# Patient Record
Sex: FEMALE | Race: WHITE | Employment: FULL TIME | URBAN - METROPOLITAN AREA
[De-identification: names, ages, dates, MRNs, and addresses within clinical notes are randomized per-mention and may not be internally consistent; named-entity substitution may affect disease eponyms.]

---

## 2017-06-08 ENCOUNTER — OFFICE VISIT (OUTPATIENT)
Dept: OBGYN CLINIC | Facility: CLINIC | Age: 27
End: 2017-06-08

## 2017-06-08 VITALS
SYSTOLIC BLOOD PRESSURE: 138 MMHG | BODY MASS INDEX: 26 KG/M2 | HEART RATE: 118 BPM | DIASTOLIC BLOOD PRESSURE: 87 MMHG | WEIGHT: 163 LBS

## 2017-06-08 DIAGNOSIS — Z11.3 SCREEN FOR STD (SEXUALLY TRANSMITTED DISEASE): Primary | ICD-10-CM

## 2017-06-08 DIAGNOSIS — N89.8 VAGINAL LESION: ICD-10-CM

## 2017-06-08 DIAGNOSIS — Z30.09 ENCOUNTER FOR COUNSELING REGARDING CONTRACEPTION: ICD-10-CM

## 2017-06-08 PROCEDURE — 99212 OFFICE O/P EST SF 10 MIN: CPT | Performed by: CLINICAL NURSE SPECIALIST

## 2017-06-12 ENCOUNTER — TELEPHONE (OUTPATIENT)
Dept: OBGYN CLINIC | Facility: CLINIC | Age: 27
End: 2017-06-12

## 2017-06-12 NOTE — PROGRESS NOTES
Hector Resendiz is a 24year old female [facility] Patient's last menstrual period was 05/25/2017. Patient presents with:  Gyn Problem: Vaginal irritation pelvic pain  Here with mom.  Last seen in 2015 by DEVON for vaginal irritation and was unhappy with ParaGard Disease Paternal Grandfather      Coronary artery disease    • Thyroid Disorder Mother    • Hypertension Mother    • Thyroid Disorder Maternal Grandmother    • Thyroid Disorder Maternal Aunt    • Heart Disorder Father        Smoking Status: Never Smoker IUD strings visualized  Uterus: normal in size, contour, position, mobility, without tenderness  Adnexa: normal without masses or tenderness  Perineum: normal  Anus: no hemorroids   Lymph node: no inguinal lymph nodes    Assessment & Plan:  Toni Moreira was seen

## 2017-06-12 NOTE — TELEPHONE ENCOUNTER
----- Message from INGRID Diamond sent at 6/12/2017  1:34 PM CDT -----  Please let pt know STD testing and vaginal culture is negative.      MAF

## 2021-04-12 ENCOUNTER — OFFICE VISIT (OUTPATIENT)
Dept: INTERNAL MEDICINE CLINIC | Facility: CLINIC | Age: 31
End: 2021-04-12
Payer: COMMERCIAL

## 2021-04-12 VITALS
HEIGHT: 65 IN | HEART RATE: 71 BPM | DIASTOLIC BLOOD PRESSURE: 75 MMHG | TEMPERATURE: 99 F | WEIGHT: 191 LBS | BODY MASS INDEX: 31.82 KG/M2 | SYSTOLIC BLOOD PRESSURE: 113 MMHG

## 2021-04-12 DIAGNOSIS — Z00.00 ROUTINE GENERAL MEDICAL EXAMINATION AT A HEALTH CARE FACILITY: Primary | ICD-10-CM

## 2021-04-12 DIAGNOSIS — Z91.09 ENVIRONMENTAL ALLERGIES: ICD-10-CM

## 2021-04-12 PROCEDURE — 3074F SYST BP LT 130 MM HG: CPT | Performed by: INTERNAL MEDICINE

## 2021-04-12 PROCEDURE — 99385 PREV VISIT NEW AGE 18-39: CPT | Performed by: INTERNAL MEDICINE

## 2021-04-12 PROCEDURE — 3008F BODY MASS INDEX DOCD: CPT | Performed by: INTERNAL MEDICINE

## 2021-04-12 PROCEDURE — 3078F DIAST BP <80 MM HG: CPT | Performed by: INTERNAL MEDICINE

## 2021-04-12 RX ORDER — BACILLUS COAGULANS/INULIN 1B-250 MG
CAPSULE ORAL
COMMUNITY

## 2021-04-12 RX ORDER — TRETINOIN 0.025 %
GEL (GRAM) TOPICAL
COMMUNITY
Start: 2021-04-07

## 2021-04-12 RX ORDER — KETOCONAZOLE 20 MG/ML
SHAMPOO TOPICAL
COMMUNITY
Start: 2020-11-20

## 2021-04-12 RX ORDER — ALPRAZOLAM 0.25 MG/1
0.25 TABLET ORAL 2 TIMES DAILY PRN
Qty: 40 TABLET | Refills: 0 | Status: SHIPPED | OUTPATIENT
Start: 2021-04-12 | End: 2021-04-13

## 2021-04-12 RX ORDER — DOXYCYCLINE HYCLATE 100 MG/1
CAPSULE ORAL
COMMUNITY
Start: 2021-04-07 | End: 2021-07-21

## 2021-04-13 ENCOUNTER — TELEPHONE (OUTPATIENT)
Dept: INTERNAL MEDICINE CLINIC | Facility: CLINIC | Age: 31
End: 2021-04-13

## 2021-04-13 RX ORDER — ALPRAZOLAM 0.25 MG/1
0.25 TABLET ORAL 2 TIMES DAILY PRN
Qty: 40 TABLET | Refills: 0 | Status: CANCELLED | OUTPATIENT
Start: 2021-04-13

## 2021-04-13 NOTE — TELEPHONE ENCOUNTER
Spoke to patient, informed of orders generated are with a routine general medical examination. Advised patient to call her insurance to confirm ALL tests will be covered with diagnosis.

## 2021-04-13 NOTE — TELEPHONE ENCOUNTER
Dr Vega Freeman: can you please re-send rx to [facility].      rx dated 4/12/21 was sent to incorrect pharm ([facility])

## 2021-04-13 NOTE — TELEPHONE ENCOUNTER
Per patient her rx med  Alprazolam was sent to a wrong pharmacy it needs to re-send to pharmacy on file.

## 2021-04-13 NOTE — PROGRESS NOTES
HPI:    Patient ID: Francesco James is a 36year old female.     HPI    Physical exam    Generally healthy  Exercises regularly  Yoga cardio  Anxious at time  Just bough a house  Stress at work denies depression  Would like to take xanax occasionally    Sees EX Apply to AA (dry areas well, then apply     • Ketoconazole 2 % External Shampoo Apply to scalp 3-5x/wk     • Tretinoin 0.025 % External Gel 1 scarlett     • Bacillus Coagulans-Inulin (PROBIOTIC) 1-250 BILLION-MG Oral Cap Take by mouth.      • ALPRAZolam Salvatore Dong normal.      Pupils: Pupils are equal, round, and reactive to light. Neck:      Thyroid: No thyromegaly. Cardiovascular:      Rate and Rhythm: Normal rate and regular rhythm.       Heart sounds: Normal heart sounds, S1 normal and S2 normal. No murmur he B12 [E]      Meds This Visit:  Requested Prescriptions     Signed Prescriptions Disp Refills   • ALPRAZolam (XANAX) 0.25 MG Oral Tab 40 tablet 0     Sig: Take 1 tablet (0.25 mg total) by mouth 2 (two) times daily as needed for Anxiety.        Imaging & Refe

## 2021-04-13 NOTE — TELEPHONE ENCOUNTER
Patient asking if the labs that she has are routine, because her insurance only covers routine labs. Also patient may call back with fax number for [facility].  She would like labs to be sent to them if she confirms with insurance that they are in her n

## 2021-04-19 ENCOUNTER — APPOINTMENT (OUTPATIENT)
Dept: MRI IMAGING | Facility: HOSPITAL | Age: 31
End: 2021-04-19
Attending: Other
Payer: COMMERCIAL

## 2021-04-19 ENCOUNTER — HOSPITAL ENCOUNTER (OUTPATIENT)
Facility: HOSPITAL | Age: 31
Setting detail: OBSERVATION
Discharge: HOME | End: 2021-04-20
Attending: EMERGENCY MEDICINE | Admitting: HOSPITALIST
Payer: COMMERCIAL

## 2021-04-19 ENCOUNTER — APPOINTMENT (OUTPATIENT)
Dept: CT IMAGING | Facility: HOSPITAL | Age: 31
End: 2021-04-19
Attending: EMERGENCY MEDICINE
Payer: COMMERCIAL

## 2021-04-19 ENCOUNTER — PATIENT MESSAGE (OUTPATIENT)
Dept: INTERNAL MEDICINE CLINIC | Facility: CLINIC | Age: 31
End: 2021-04-19

## 2021-04-19 ENCOUNTER — NURSE TRIAGE (OUTPATIENT)
Dept: INTERNAL MEDICINE CLINIC | Facility: CLINIC | Age: 31
End: 2021-04-19

## 2021-04-19 DIAGNOSIS — H21.562 AFFERENT PUPILLARY DEFECT OF LEFT EYE: Primary | ICD-10-CM

## 2021-04-19 PROCEDURE — 99244 OFF/OP CNSLTJ NEW/EST MOD 40: CPT | Performed by: OTHER

## 2021-04-19 PROCEDURE — 70553 MRI BRAIN STEM W/O & W/DYE: CPT | Performed by: OTHER

## 2021-04-19 PROCEDURE — 99219 INITIAL OBSERVATION CARE,LEVL II: CPT | Performed by: HOSPITALIST

## 2021-04-19 PROCEDURE — 70498 CT ANGIOGRAPHY NECK: CPT | Performed by: EMERGENCY MEDICINE

## 2021-04-19 PROCEDURE — 70543 MRI ORBT/FAC/NCK W/O &W/DYE: CPT | Performed by: OTHER

## 2021-04-19 PROCEDURE — 70496 CT ANGIOGRAPHY HEAD: CPT | Performed by: EMERGENCY MEDICINE

## 2021-04-19 RX ORDER — ALPRAZOLAM 0.25 MG/1
0.25 TABLET ORAL 2 TIMES DAILY PRN
Status: DISCONTINUED | OUTPATIENT
Start: 2021-04-19 | End: 2021-04-20

## 2021-04-19 RX ORDER — ONDANSETRON 2 MG/ML
4 INJECTION INTRAMUSCULAR; INTRAVENOUS EVERY 6 HOURS PRN
Status: DISCONTINUED | OUTPATIENT
Start: 2021-04-19 | End: 2021-04-20

## 2021-04-19 RX ORDER — TEMAZEPAM 15 MG/1
15 CAPSULE ORAL NIGHTLY PRN
Status: DISCONTINUED | OUTPATIENT
Start: 2021-04-19 | End: 2021-04-20

## 2021-04-19 RX ORDER — ACETAMINOPHEN 325 MG/1
650 TABLET ORAL EVERY 6 HOURS PRN
Status: DISCONTINUED | OUTPATIENT
Start: 2021-04-19 | End: 2021-04-20

## 2021-04-19 RX ORDER — CETIRIZINE HYDROCHLORIDE 10 MG/1
10 TABLET ORAL DAILY
Status: DISCONTINUED | OUTPATIENT
Start: 2021-04-20 | End: 2021-04-20

## 2021-04-19 NOTE — TELEPHONE ENCOUNTER
From: Saran Villalba  To: Nidhi Rizo MD  Sent: 4/19/2021 9:57 AM CDT  Subject: Other    Acqupolaa Anabella

## 2021-04-19 NOTE — H&P
[facility]    PATIENT'S NAME: Umberto Pallavi   ATTENDING PHYSICIAN: Sarah Burch MD   PATIENT ACCOUNT#:   [id number]    LOCATION:  [place] [address]  MEDICAL RECORD #:   [id number]       YOB: 1990  ADMISSION DATE:       04/19 is clear. Dry mucous membranes. Normal hard and soft palate. Eyes:  Anicteric sclerae. Left pupil is dilated, nonresponsive to light or accommodation. Afferent and efferent nerve roots tested by testing the right eye which pupil is reactive.   NECK:  S

## 2021-04-19 NOTE — TELEPHONE ENCOUNTER
Advised patient of Dr. Marylu Alegria note. Patient verbalized understanding and already on her way with her mom to the [address] [place] [address] now.

## 2021-04-19 NOTE — ED INITIAL ASSESSMENT (HPI)
Patient presents with left eye dilated since waking up this morning. Notes 4/10 headache behind eyes. Notes some blurry vision.

## 2021-04-19 NOTE — ED PROVIDER NOTES
Patient Seen in: [facility]      History   Patient presents with: Eye Visual Problem  Headache    Stated Complaint: dilated eyes    HPI/Subjective:   HPI  Patient is a 73-year-old female here for asymmetric pupils.   Patient r She is not toxic-appearing. HENT:      Head: Normocephalic. Mouth/Throat:      Mouth: Mucous membranes are moist.   Eyes:      Extraocular Movements: Extraocular movements intact. Conjunctiva/sclera: Conjunctivae normal.      Comments:  Both eye 4/19/2021  CONCLUSION:  1. No acute intracranial process by noncontrast/contrast-enhanced CT technique. 2. No hemodynamically significant stenosis or occlusion of the anterior or posterior intracranial circulation.   3. 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

## 2021-04-19 NOTE — CONSULTS
[facility]  [address] [facility]  [phone]          [address] [facility] [place]    Report of Consultation    Henri Villalba Patient Status:  Emergency diphenhydrAMINE HCl (BENADRYL ALLERGY OR)      • ALUMINUM CHLORIDE HEXAHYDRATE EX Apply to AA (dry areas well, then apply     • Ketoconazole 2 % External Shampoo Apply to scalp 3-5x/wk     • Tretinoin 0.025 % External Gel 1 scarlett     • Bacillus Coagulans-Inu Sexual Activity      Alcohol use: Not Currently        Alcohol/week: 0.0 standard drinks        Comment: Social.       Drug use: Never      Sexual activity: Yes        Partners: Male        Birth control/protection: Paragard        Comment: 10/2013      FA Alert, oriented to person, place and time, Follows commands, and Speech fluent and appropriate. Cranial Nerves:  An alert room she has bilateral 4 mm dilated pupils, in a dark room her left pupil is 4 mm and nonreactive, in a dark room her right pupil is 3 mg/dL 85   Sodium      136 - 145 mmol/L 141   Potassium      3.5 - 5.1 mmol/L 3.8   Chloride      98 - 112 mmol/L 110   Carbon Dioxide, Total      21.0 - 32.0 mmol/L 25.0   ANION GAP      0 - 18 mmol/L 6   BUN      7 - 18 mg/dL 9   CREATININE      0.55 - 1 –If enhancement of optic nerves is seen, will start high-dose steroids (IVMP 1000mg x 5 days)   –Check B12 and MMA      Adalberto Brasher DO   Staff Neurologist   4/19/2021  2:29 PM

## 2021-04-19 NOTE — ED QUICK NOTES
Orders for admission, patient is aware of plan and ready to go upstairs. Any questions, please call ED ABEL Marks  at extension [phone].    Type of COVID test sent:Rapid  COVID Suspicion level: Low    Titratable drug(s) infusing:n/a  Rate:n/a    LOC at time

## 2021-04-19 NOTE — TELEPHONE ENCOUNTER
Action Requested: Summary for Provider     []  Critical Lab, Recommendations Needed  [x] Need Additional Advice  []   FYI    []   Need Orders  [] Need Medications Sent to Pharmacy  []  Other     SUMMARY: Patient stated that she took xanax for the first devin

## 2021-04-19 NOTE — ED QUICK NOTES
Patient presents to ER with concerns of L pupil dilation. Patients Left pupil is dilated. Patient states she woke up like that this AM.   Left pupil is Non reactive to light. Patient denies any vision changes.    Patient is AOx4, equal strength bilate

## 2021-04-20 VITALS
HEART RATE: 84 BPM | RESPIRATION RATE: 18 BRPM | OXYGEN SATURATION: 97 % | SYSTOLIC BLOOD PRESSURE: 128 MMHG | WEIGHT: 190 LBS | DIASTOLIC BLOOD PRESSURE: 83 MMHG | BODY MASS INDEX: 32 KG/M2 | TEMPERATURE: 99 F

## 2021-04-20 PROCEDURE — 99217 OBSERVATION CARE DISCHARGE: CPT | Performed by: HOSPITALIST

## 2021-04-20 PROCEDURE — 99212 OFFICE O/P EST SF 10 MIN: CPT | Performed by: OTHER

## 2021-04-20 RX ORDER — MAGNESIUM SULFATE HEPTAHYDRATE 40 MG/ML
2 INJECTION, SOLUTION INTRAVENOUS ONCE
Status: COMPLETED | OUTPATIENT
Start: 2021-04-20 | End: 2021-04-20

## 2021-04-20 RX ORDER — KETOROLAC TROMETHAMINE 30 MG/ML
30 INJECTION, SOLUTION INTRAMUSCULAR; INTRAVENOUS ONCE
Status: COMPLETED | OUTPATIENT
Start: 2021-04-20 | End: 2021-04-20

## 2021-04-20 RX ORDER — IBUPROFEN 400 MG/1
400 TABLET ORAL EVERY 6 HOURS PRN
Status: DISCONTINUED | OUTPATIENT
Start: 2021-04-20 | End: 2021-04-20

## 2021-04-20 RX ORDER — DIPHENHYDRAMINE HYDROCHLORIDE 50 MG/ML
25 INJECTION INTRAMUSCULAR; INTRAVENOUS ONCE
Status: COMPLETED | OUTPATIENT
Start: 2021-04-20 | End: 2021-04-20

## 2021-04-20 RX ORDER — SODIUM CHLORIDE 9 MG/ML
INJECTION, SOLUTION INTRAVENOUS CONTINUOUS
Status: ACTIVE | OUTPATIENT
Start: 2021-04-20 | End: 2021-04-20

## 2021-04-20 RX ORDER — PROCHLORPERAZINE EDISYLATE 5 MG/ML
10 INJECTION INTRAMUSCULAR; INTRAVENOUS ONCE
Status: COMPLETED | OUTPATIENT
Start: 2021-04-20 | End: 2021-04-20

## 2021-04-20 NOTE — PROGRESS NOTES
Discharge RN Summary: Patient has discharge order in. Patient to discharge home. IV removed by this RN. Understands to follow up with PCP in 1 week/ neurology in 3 weeks. Patient understands no new meds.        Scripts sent with pt: none  Electronically se

## 2021-04-20 NOTE — PLAN OF CARE
Problem: Patient Centered Care  Goal: Patient preferences are identified and integrated in the patient's plan of care  Description: Interventions:  - What would you like us to know as we care for you?  Per pt no recent trauma  - Provide timely, complete, PAIN - ADULT  Goal: Verbalizes/displays adequate comfort level or patient's stated pain goal  Description: INTERVENTIONS:  - Encourage pt to monitor pain and request assistance  - Assess pain using appropriate pain scale  - Administer analgesics based on t transportation as appropriate  - Identify discharge learning needs (meds, wound care, etc)  - Arrange for interpreters to assist at discharge as needed  - Consider post-discharge preferences of patient/family/discharge partner  - Complete POLST form as scarlett

## 2021-04-20 NOTE — PROGRESS NOTES
[facility]  [address] [facility], [address] [place] [address]  [phone]          INPATIENT NEUROLOGY   FOLLOW UP CONSULT NOTE       [facility]    Report of Consultation    Tyrone Villalba Patient Status:  Observatio uL 0.04   Immature Granulocyte Absolute      0.00 - 1.00 x10(3) uL 0.01   Neutrophils %      % 42.4   Lymphocytes %      % 40.2   Monocytes %      % 11.7   Eosinophils %      % 4.8   Basophils %      % 0.7   Immature Granulocyte %      % 0.2   Glucose cocktail   --Advised to keep headache diary at home   --Migraine lifestyle information given, start B2 and Magnesium supplements at home      1. Michael Cardozo, and [address] Federico Okta Alissa.  “Migraine with benign episodic unilateral mydriasis.” International journal of g

## 2021-04-20 NOTE — TELEPHONE ENCOUNTER
Patient currently admitted for monitoring  and observation. Will call patient later in the week for hospital follow-up.

## 2021-04-20 NOTE — DISCHARGE SUMMARY
[facility]    Discharge Summary    Jessica Villalba Patient Status:  Observation    1990 MRN [id number]   Location [facility] 5SW/SE Attending Sin Andre MD   Hosp Day # 0 PCP Herlinda Cavanaugh MD     Date of Admission of the brain and CT angiogram were negative. The patient will be admitted to the hospital for further management and observation. Hospital Course:    Anisocoria  Migraine, retro-orbital   Evaluated by neurology, MRI brain negative, CTA w/o dissection or

## 2021-04-20 NOTE — PLAN OF CARE
Problem: Patient Centered Care  Goal: Patient preferences are identified and integrated in the patient's plan of care  Description: Interventions:  - What would you like us to know as we care for you?  I live with my boyfriend  - Provide timely, complete, status  Outcome: Progressing     Problem: PAIN - ADULT  Goal: Verbalizes/displays adequate comfort level or patient's stated pain goal  Description: INTERVENTIONS:  - Encourage pt to monitor pain and request assistance  - Assess pain using appropriate pain resources and transportation as appropriate  - Identify discharge learning needs (meds, wound care, etc)  - Arrange for interpreters to assist at discharge as needed  - Consider post-discharge preferences of patient/family/discharge partner  - Complete GENESIS

## 2021-04-20 NOTE — TELEPHONE ENCOUNTER
Disposition and Plan      Clinical Impression:  Afferent pupillary defect of left eye  (primary encounter diagnosis)      Disposition:  Admit  4/19/2021  2:56 pm     Follow-up:  No follow-up provider specified.        Medications Prescribed:  Current Disch

## 2021-05-14 ENCOUNTER — TELEPHONE (OUTPATIENT)
Dept: FAMILY MEDICINE CLINIC | Facility: CLINIC | Age: 31
End: 2021-05-14

## 2021-05-14 NOTE — TELEPHONE ENCOUNTER
Pt states she received a call to schedule a follow up appt with PCP, pt was seen in ER and admitted to hospital on 4/19/21. Pt states when she woke up that day her right eye pupil was dilated and left pupil was smaller.  Pt had MRi brain and CTA carotid art

## 2021-05-17 ENCOUNTER — OFFICE VISIT (OUTPATIENT)
Dept: ALLERGY | Facility: CLINIC | Age: 31
End: 2021-05-17
Payer: COMMERCIAL

## 2021-05-17 VITALS
SYSTOLIC BLOOD PRESSURE: 130 MMHG | OXYGEN SATURATION: 100 % | HEART RATE: 63 BPM | DIASTOLIC BLOOD PRESSURE: 85 MMHG | RESPIRATION RATE: 18 BRPM

## 2021-05-17 DIAGNOSIS — Z91.018 FOOD ALLERGY: ICD-10-CM

## 2021-05-17 DIAGNOSIS — J30.89 PERENNIAL ALLERGIC RHINITIS WITH SEASONAL VARIATION: Primary | ICD-10-CM

## 2021-05-17 DIAGNOSIS — K90.49 ALCOHOL INTOLERANCE: ICD-10-CM

## 2021-05-17 DIAGNOSIS — J30.2 PERENNIAL ALLERGIC RHINITIS WITH SEASONAL VARIATION: Primary | ICD-10-CM

## 2021-05-17 DIAGNOSIS — L29.9 ITCHY SKIN: ICD-10-CM

## 2021-05-17 DIAGNOSIS — R21 FACIAL RASH: ICD-10-CM

## 2021-05-17 PROCEDURE — 3075F SYST BP GE 130 - 139MM HG: CPT | Performed by: ALLERGY & IMMUNOLOGY

## 2021-05-17 PROCEDURE — 3079F DIAST BP 80-89 MM HG: CPT | Performed by: ALLERGY & IMMUNOLOGY

## 2021-05-17 PROCEDURE — 99244 OFF/OP CNSLTJ NEW/EST MOD 40: CPT | Performed by: ALLERGY & IMMUNOLOGY

## 2021-05-17 RX ORDER — AZELASTINE HYDROCHLORIDE 0.5 MG/ML
1 SOLUTION/ DROPS OPHTHALMIC 2 TIMES DAILY
Qty: 1 BOTTLE | Refills: 0 | Status: SHIPPED | OUTPATIENT
Start: 2021-05-17

## 2021-05-17 NOTE — PROGRESS NOTES
Sneha Dhaliwal is a 36year old female. HPI:   Patient presents with: Allergies: Patient taking Allegra daily. Seasonal and environmental allergies. Hx of severe dog and cat allergies.  Reports that she gets itchy/red eyes, headaches, hives, congestion • Heart Disease Paternal Grandfather         Coronary artery disease    • Thyroid Disorder Mother    • Hypertension Mother    • Thyroid Disorder Maternal Grandmother    • Thyroid Disorder Maternal Aunt    • Heart Disorder Father       Social History: Soc hpi   Eyes:  Negative for eye discharge and vision loss  Gastrointestinal:  Negative for abdominal pain, diarrhea and vomiting  Genitourinary:  Negative for dysuria and hematuria  Hema/Lymph:  Negative for easy bleeding and easy bruising  Integumentary: peak allergy seasons to prevent symptoms  Check serum IgE panel to environmental allergens. We will call with results. Patient interested in immunotherapy.     2.  Food allergies  Check celiac panel due to GI symptoms including pain bloating with potentia

## 2021-05-18 NOTE — PATIENT INSTRUCTIONS
1. Ar  Handouts on allergies and avoidance measures provided and reviewed including potential treatment option of immunotherapy  Start trial of Xyzal, levocetirizine 5 mg once a night at bedtime in place of Allegra  Add Flonase or Nasacort 2 sprays per nos

## 2021-05-25 ENCOUNTER — PATIENT MESSAGE (OUTPATIENT)
Dept: ALLERGY | Facility: CLINIC | Age: 31
End: 2021-05-25

## 2021-05-25 NOTE — TELEPHONE ENCOUNTER
From: Jt Villalba  To: Samir Preciado MD  Sent: 5/25/2021 11:23 AM CDT  Subject: Non-Urgent Medical Question    To whom this concerns,     I'm looking for CPT/ procedure codes for my following 4 lab orders:   -Allergy Region 8?   -Grape (Allgrapso)?

## 2021-05-25 NOTE — TELEPHONE ENCOUNTER
ALLERGY REGION 8 [address] [facility] [[id number]    F004 WHEAT [[id number]    CELIAC DISEASE SCREEN REFLEX [[id number]]

## 2021-07-21 ENCOUNTER — TELEMEDICINE (OUTPATIENT)
Dept: TELEHEALTH | Age: 31
End: 2021-07-21

## 2021-07-21 DIAGNOSIS — Z02.9 ADMINISTRATIVE ENCOUNTER: Primary | ICD-10-CM

## 2021-07-21 DIAGNOSIS — H92.02 LEFT EAR PAIN: ICD-10-CM

## 2021-07-21 DIAGNOSIS — J06.9 VIRAL UPPER RESPIRATORY TRACT INFECTION: ICD-10-CM

## 2021-07-21 PROCEDURE — 99213 OFFICE O/P EST LOW 20 MIN: CPT | Performed by: NURSE PRACTITIONER

## 2021-07-21 NOTE — PROGRESS NOTES
Virtual/Telephone Check-In    Julio Cesar Villalba verbally consents to a Virtual/Telephone Check-In service on 07/21/21. Patient has been referred to the [facility] website at [email or web address] to review the yearly Consent to Treat document.   Patient Leila COMPLAINT:   Patient presents with:  Ear Pain  Sore Throat      HPI:   Rico Herrera is a 36year old female who presents for a video visit. Patient reports upper respiratory symptoms that started last night.   Reports cough, left ear sensitivity, sore th used    Alcohol use: Not Currently      Alcohol/week: 0.0 standard drinks      Comment: Social.     Drug use: Never        REVIEW OF SYSTEMS:   GENERAL: normal appetite  SKIN: no rashes or abnormal skin lesions  HEENT: See HPI  LUNGS: admits cough.  No whee video visit if she did not follow up at [facility] that day since patient was evaluated given treatment recommendations. Changed LOS for visit.      Meds & Refills for this Visit:  Requested Prescriptions      No prescriptions requested or ordered in this

## 2021-07-23 ENCOUNTER — TELEPHONE (OUTPATIENT)
Dept: INTERNAL MEDICINE CLINIC | Facility: CLINIC | Age: 31
End: 2021-07-23

## 2021-07-23 NOTE — TELEPHONE ENCOUNTER
Pt is requesting to have antibiotic for possible ear infection sent to her preferred pharmacy. She had a telemedicine visit with Milagros DEMPSEY on 07/21/2021 where nurse states that she put antibiotic on hold to see if pt would get better in the next few days. Pt states that she is only getting worse. Pt states that if aprn cant approve antibiotic, that if her pcp Jo Read can. \"Whoever can approve it faster\" states pt.   Pt wants to avoid going to immediate care to avoid more expensive charges  Please advise

## 2021-08-30 ENCOUNTER — IMMUNIZATION (OUTPATIENT)
Dept: LAB | Facility: HOSPITAL | Age: 31
End: 2021-08-30
Attending: EMERGENCY MEDICINE
Payer: COMMERCIAL

## 2021-08-30 DIAGNOSIS — Z23 NEED FOR VACCINATION: Primary | ICD-10-CM

## 2021-08-30 PROCEDURE — 0001A SARSCOV2 VAC 30MCG/0.3ML IM: CPT

## 2021-09-14 ENCOUNTER — OFFICE VISIT (OUTPATIENT)
Dept: OTOLARYNGOLOGY | Facility: CLINIC | Age: 31
End: 2021-09-14
Payer: COMMERCIAL

## 2021-09-14 ENCOUNTER — NURSE TRIAGE (OUTPATIENT)
Dept: INTERNAL MEDICINE CLINIC | Facility: CLINIC | Age: 31
End: 2021-09-14

## 2021-09-14 VITALS — WEIGHT: 185 LBS | BODY MASS INDEX: 30.82 KG/M2 | TEMPERATURE: 99 F | HEIGHT: 65 IN

## 2021-09-14 DIAGNOSIS — H61.23 BILATERAL IMPACTED CERUMEN: Primary | ICD-10-CM

## 2021-09-14 PROCEDURE — 3008F BODY MASS INDEX DOCD: CPT | Performed by: OTOLARYNGOLOGY

## 2021-09-14 NOTE — TELEPHONE ENCOUNTER
----- Message from [facility] sent at 9/14/2021  7:33 AM CDT -----  Regarding: Q-tip Stuck in Ear   Hi Dr Libertad Delarosa-   I unfortunately got a que tip end stuck in my ear this morning. Is this something I can come into the office for today or tomorrow.  I

## 2021-09-14 NOTE — PROGRESS NOTES
Patient thought she put a Q-tip in her ear and lost the tip she came in and on exam there was no Q-tip present she is very concerned about the cost of this visit so I stated that I would no charge her for today

## 2021-09-14 NOTE — TELEPHONE ENCOUNTER
Action Requested: Summary for Provider     []  Critical Lab, Recommendations Needed  [] Need Additional Advice  []   FYI    []   Need Orders  [] Need Medications Sent to Pharmacy  []  Other     SUMMARY:   The call was transferred to ENT and they can schedu

## 2021-09-16 ENCOUNTER — TELEPHONE (OUTPATIENT)
Dept: ALLERGY | Facility: CLINIC | Age: 31
End: 2021-09-16

## 2021-09-16 NOTE — TELEPHONE ENCOUNTER
Labs from 5/17/2021 have not been completed. Letter sent home. Postponed x 2 months. Dr. Vanessa Ch, if labs have not been completed in that time okay to cancel?

## 2021-09-21 ENCOUNTER — IMMUNIZATION (OUTPATIENT)
Dept: LAB | Facility: HOSPITAL | Age: 31
End: 2021-09-21
Attending: EMERGENCY MEDICINE
Payer: COMMERCIAL

## 2021-09-21 DIAGNOSIS — Z23 NEED FOR VACCINATION: Primary | ICD-10-CM

## 2021-09-21 PROCEDURE — 0002A SARSCOV2 VAC 30MCG/0.3ML IM: CPT

## 2021-12-19 ENCOUNTER — TELEMEDICINE (OUTPATIENT)
Dept: TELEHEALTH | Age: 31
End: 2021-12-19

## 2021-12-19 DIAGNOSIS — J20.9 ACUTE BRONCHITIS, UNSPECIFIED ORGANISM: ICD-10-CM

## 2021-12-19 DIAGNOSIS — J01.90 ACUTE NON-RECURRENT SINUSITIS, UNSPECIFIED LOCATION: Primary | ICD-10-CM

## 2021-12-19 PROCEDURE — 99213 OFFICE O/P EST LOW 20 MIN: CPT | Performed by: NURSE PRACTITIONER

## 2021-12-19 RX ORDER — BENZONATATE 200 MG/1
200 CAPSULE ORAL 3 TIMES DAILY PRN
Qty: 30 CAPSULE | Refills: 0 | Status: SHIPPED | OUTPATIENT
Start: 2021-12-19

## 2021-12-19 RX ORDER — PREDNISONE 20 MG/1
40 TABLET ORAL DAILY
Qty: 10 TABLET | Refills: 0 | Status: SHIPPED | OUTPATIENT
Start: 2021-12-19 | End: 2021-12-24

## 2021-12-19 RX ORDER — ALBUTEROL SULFATE 90 UG/1
2 AEROSOL, METERED RESPIRATORY (INHALATION) EVERY 6 HOURS PRN
Qty: 1 EACH | Refills: 0 | Status: SHIPPED | OUTPATIENT
Start: 2021-12-19

## 2021-12-19 RX ORDER — AMOXICILLIN AND CLAVULANATE POTASSIUM 875; 125 MG/1; MG/1
1 TABLET, FILM COATED ORAL 2 TIMES DAILY
Qty: 20 TABLET | Refills: 0 | Status: SHIPPED | OUTPATIENT
Start: 2021-12-19 | End: 2021-12-29

## 2021-12-19 NOTE — PATIENT INSTRUCTIONS
Inhaler can be used every 6 hours as needed for wheezing. Prednisone will help to reduce wheezing and inflammation in the lungs. If the cough is dry and persistent you can use the benzonatate 3x daily as needed.   Antibiotic may be filled for sinus inf given to anyone younger than 25years of age who is ill with a viral infection or fever. It may cause severe liver or brain damage. · Your appetite may be poor, so a light diet is fine.  Avoid dehydration by drinking 6 to 8 glasses of fluids per day (such

## 2021-12-19 NOTE — PROGRESS NOTES
Virtual/Telephone Check-In    Andrade Villalba verbally consents to a Virtual/Telephone Check-In service on 12/19/21. Patient has been referred to the [facility] website at [email or web address] to review the yearly Consent to Treat document.   Patient Jiménez Stain COMPLAINT:   Patient presents with:  Upper Respiratory Infection      HPI:   Clair Olmstead is a 36year old female who presents for upper respiratory symptoms for  2 weeks.  Patient reports sinus congestion, ear pressure,deep cough, chest congestion, and o tobacco: Never Used    Vaping Use      Vaping Use: Never used    Alcohol use: Not Currently      Alcohol/week: 0.0 standard drinks      Comment: Social.     Drug use: Never        REVIEW OF SYSTEMS:   GENERAL: normal appetite. Admits fatigue. No fever.    Coral Castro in the lungs. If the cough is dry and persistent you can use the benzonatate 3x daily as needed. Antibiotic may be filled for sinus infection. Follow up in person for any new or worsening symptoms or if not improving over the next week.    Push fluids brain damage. · Your appetite may be poor, so a light diet is fine. Avoid dehydration by drinking 6 to 8 glasses of fluids per day (such as water, soft drinks, sports drinks, juices, tea, or soup).  Extra fluids will help loosen secretions in the nose and or call 911 for worsening symptoms or acute distress.

## 2021-12-23 ENCOUNTER — NURSE TRIAGE (OUTPATIENT)
Dept: INTERNAL MEDICINE CLINIC | Facility: CLINIC | Age: 31
End: 2021-12-23

## 2021-12-23 RX ORDER — FLUCONAZOLE 150 MG/1
TABLET ORAL
Qty: 2 TABLET | Refills: 0 | Status: SHIPPED | OUTPATIENT
Start: 2021-12-23

## 2021-12-23 NOTE — TELEPHONE ENCOUNTER
[employer] message sent to pt to contact office. Amrit Haile MD 1 hour ago (11:05 AM)     PD      Hi Doctor Sugey-      I recently saw NP- Alexandra Ahmadi who gave me Amoxicillin on Sunday 12/19/2021.  I am traveling to [facility] on Monday

## 2021-12-23 NOTE — TELEPHONE ENCOUNTER
Spoke with patient,informed that her medication was sent today states that she is on her way to her pharmacy now as she received a notification.

## 2021-12-23 NOTE — TELEPHONE ENCOUNTER
Action Requested: Summary for Provider     []  Critical Lab, Recommendations Needed  [x] Need Additional Advice  []   FYI    []   Need Orders  [x] Need Medications Sent to Pharmacy  []  Other     SUMMARY: Diflucan pended per patient request/MyChart message

## 2022-08-10 ENCOUNTER — OFFICE VISIT (OUTPATIENT)
Dept: INTERNAL MEDICINE CLINIC | Facility: CLINIC | Age: 32
End: 2022-08-10
Payer: COMMERCIAL

## 2022-08-10 VITALS
SYSTOLIC BLOOD PRESSURE: 122 MMHG | HEIGHT: 65 IN | BODY MASS INDEX: 31 KG/M2 | DIASTOLIC BLOOD PRESSURE: 81 MMHG | HEART RATE: 89 BPM

## 2022-08-10 DIAGNOSIS — Z12.4 SCREENING FOR CERVICAL CANCER: ICD-10-CM

## 2022-08-10 DIAGNOSIS — Z00.00 PHYSICAL EXAM: Primary | ICD-10-CM

## 2022-08-10 PROCEDURE — 3079F DIAST BP 80-89 MM HG: CPT | Performed by: INTERNAL MEDICINE

## 2022-08-10 PROCEDURE — 3008F BODY MASS INDEX DOCD: CPT | Performed by: INTERNAL MEDICINE

## 2022-08-10 PROCEDURE — 99395 PREV VISIT EST AGE 18-39: CPT | Performed by: INTERNAL MEDICINE

## 2022-08-10 PROCEDURE — 3074F SYST BP LT 130 MM HG: CPT | Performed by: INTERNAL MEDICINE

## 2022-08-10 RX ORDER — ONDANSETRON 4 MG/1
4 TABLET, ORALLY DISINTEGRATING ORAL EVERY 8 HOURS PRN
Qty: 12 TABLET | Refills: 0 | Status: SHIPPED | OUTPATIENT
Start: 2022-08-10 | End: 2022-08-17

## 2022-08-10 RX ORDER — ESCITALOPRAM OXALATE 5 MG/1
5 TABLET ORAL DAILY
Qty: 30 TABLET | Refills: 2 | Status: SHIPPED | OUTPATIENT
Start: 2022-08-10 | End: 2022-11-08

## 2022-09-07 ENCOUNTER — OFFICE VISIT (OUTPATIENT)
Dept: OBGYN CLINIC | Facility: CLINIC | Age: 32
End: 2022-09-07
Payer: COMMERCIAL

## 2022-09-07 ENCOUNTER — TELEPHONE (OUTPATIENT)
Dept: INTERNAL MEDICINE CLINIC | Facility: CLINIC | Age: 32
End: 2022-09-07

## 2022-09-07 VITALS
SYSTOLIC BLOOD PRESSURE: 133 MMHG | BODY MASS INDEX: 31.72 KG/M2 | HEART RATE: 59 BPM | DIASTOLIC BLOOD PRESSURE: 83 MMHG | WEIGHT: 195 LBS | HEIGHT: 65.7 IN

## 2022-09-07 DIAGNOSIS — Z01.419 WELL WOMAN EXAM WITH ROUTINE GYNECOLOGICAL EXAM: Primary | ICD-10-CM

## 2022-09-07 DIAGNOSIS — Z97.5 PRESENCE OF IUD: ICD-10-CM

## 2022-09-07 DIAGNOSIS — Z12.4 SCREENING FOR MALIGNANT NEOPLASM OF CERVIX: ICD-10-CM

## 2022-09-07 PROCEDURE — 3079F DIAST BP 80-89 MM HG: CPT | Performed by: NURSE PRACTITIONER

## 2022-09-07 PROCEDURE — 3075F SYST BP GE 130 - 139MM HG: CPT | Performed by: NURSE PRACTITIONER

## 2022-09-07 PROCEDURE — 3008F BODY MASS INDEX DOCD: CPT | Performed by: NURSE PRACTITIONER

## 2022-09-07 PROCEDURE — 99385 PREV VISIT NEW AGE 18-39: CPT | Performed by: NURSE PRACTITIONER

## 2022-09-07 RX ORDER — LEVONORGESTREL 52 MG/1
1 INTRAUTERINE DEVICE INTRAUTERINE ONCE
COMMUNITY

## 2022-09-07 NOTE — TELEPHONE ENCOUNTER
Patient called and had question regarding her lexapro refills. Chart reviewed she still has 2 additional refills. She states [facility] told her they it was denied. Or she wasn't able to refill. I told her to call [facility] and ask them to fill her refill. She should be able to get it. Patient also advised to f/u in a month with DR Mayes per 8/10/22 OV notes.

## 2022-09-08 LAB — HPV I/H RISK 1 DNA SPEC QL NAA+PROBE: NEGATIVE

## 2022-11-10 RX ORDER — ESCITALOPRAM OXALATE 5 MG/1
TABLET ORAL
Qty: 30 TABLET | Refills: 2 | OUTPATIENT
Start: 2022-11-10

## 2023-03-08 ENCOUNTER — PATIENT MESSAGE (OUTPATIENT)
Dept: INTERNAL MEDICINE CLINIC | Facility: CLINIC | Age: 33
End: 2023-03-08

## 2023-03-09 RX ORDER — ESCITALOPRAM OXALATE 5 MG/1
5 TABLET ORAL DAILY
Qty: 90 TABLET | Refills: 3 | Status: CANCELLED | OUTPATIENT
Start: 2023-03-09

## 2023-03-09 RX ORDER — ESCITALOPRAM OXALATE 5 MG/1
5 TABLET ORAL DAILY
Qty: 90 TABLET | Refills: 3 | Status: SHIPPED | OUTPATIENT
Start: 2023-03-09

## 2023-03-09 NOTE — TELEPHONE ENCOUNTER
Refill passed per [place] [employer], [facility] protocol. Requested Prescriptions   Pending Prescriptions Disp Refills    escitalopram 5 MG Oral Tab 90 tablet 3     Sig: Take 1 tablet (5 mg total) by mouth daily. Psychiatric Non-Scheduled (Anti-Anxiety) Passed - 3/9/2023 12:44 PM        Passed - In person appointment or virtual visit in the past 6 mos or appointment in next 3 mos     Recent Outpatient Visits              6 months ago Well woman exam with routine gynecological exam    Buzz Duran, [address], [address] [facility], APRN    Office Visit    7 months ago Physical exam    Christopher Beckwith MD    Office Visit    1 year ago Acute non-recurrent sinusitis, unspecified location    Buzz Duran, Virtual Visit ROSELYN Abad    Telemedicine    1 year ago Bilateral impacted Ledon Baumgarten, [address], [facility], MD    Office Visit    1 year ago Administrative encounter    Buzz Druan, Virtual Visit ROSELYN Abad    Telemedicine          Future Appointments         Provider Department Appt Notes    Tomorrow MD Buzz Carvajal, [address] [facility] Need refill of Lexpro.

## 2023-03-10 RX ORDER — ALPRAZOLAM 0.25 MG/1
0.25 TABLET ORAL 2 TIMES DAILY PRN
Qty: 40 TABLET | Refills: 0 | Status: SHIPPED | OUTPATIENT
Start: 2023-03-10 | End: 2023-03-10

## 2023-03-10 NOTE — TELEPHONE ENCOUNTER
Please review; protocol failed. Requested Prescriptions   Pending Prescriptions Disp Refills    ALPRAZolam (XANAX) 0.25 MG Oral Tab 40 tablet 0     Sig: Take 1 tablet (0.25 mg total) by mouth 2 (two) times daily as needed for Anxiety.        There is no refill protocol information for this order          Future Appointments         Provider Department Appt Notes    Tomorrow Nas Alvarez MD [address], [address] [facility] Need refill of Alprazolam            Recent Outpatient Visits              6 months ago Well woman exam with routine gynecological exam    [address], [address] [facility],[address], [address] [facility], APRN    Office Visit    7 months ago Physical exam    Lela Mcfadden MD    Office Visit    1 year ago Acute non-recurrent sinusitis, unspecified location    [address], Virtual Visit ROSELYN rOdonez    Telemedicine    1 year ago Bilateral impacted Karoline Bound, [address] Elizabeth Murguia [facility]    Office Visit    1 year ago Administrative encounter    [address], Virtual Visit ROSELYN Ordonez    Telemedicine

## 2023-03-10 NOTE — TELEPHONE ENCOUNTER
Please review; protocol failed. Requested Prescriptions   Pending Prescriptions Disp Refills    ALPRAZolam (XANAX) 0.25 MG Oral Tab 40 tablet 0     Sig: Take 1 tablet (0.25 mg total) by mouth 2 (two) times daily as needed for Anxiety.        There is no refill protocol information for this order          Future Appointments         Provider Department Appt Notes    Tomorrow MD Aldo Justice, [address] [facility] Need refill of Alprazolam            Recent Outpatient Visits              6 months ago Well woman exam with routine gynecological exam    Aldo Valdez, [address], [address] [facility] 94, APRN    Office Visit    7 months ago Physical exam    Jessica Damon MD    Office Visit    1 year ago Acute non-recurrent sinusitis, unspecified location    Aldo Valdez, Virtual Visit ROSELYN Tolentino    Telemedicine    1 year ago Bilateral impacted Nisha Clark, [address] Barbara Abrams [facility]    Office Visit    1 year ago Administrative encounter    Aldo Valdez, Virtual Visit ROSELYN Tolentino    Telemedicine

## 2023-07-21 ENCOUNTER — PATIENT MESSAGE (OUTPATIENT)
Dept: INTERNAL MEDICINE CLINIC | Facility: CLINIC | Age: 33
End: 2023-07-21

## 2023-07-23 NOTE — TELEPHONE ENCOUNTER
From: Destinee Villalba  To: Claribel Braden MD  Sent: 7/21/2023 5:43 PM CDT  Subject: Kymberly Mandel your well. I'm interested in exploring a prescription with [facility] ( my mom has had great results). Told me to connect with you. Would this require a visit or something we could dicuss on filling outside [employer].      Thank you,   Tee Persaud

## 2023-07-25 NOTE — TELEPHONE ENCOUNTER
Dr Mayes=is it ok for a video appointment to discuss [facility] or can we use your res 24 for this ?

## 2023-07-27 ENCOUNTER — LAB ENCOUNTER (OUTPATIENT)
Dept: LAB | Age: 33
End: 2023-07-27
Attending: INTERNAL MEDICINE
Payer: COMMERCIAL

## 2023-07-27 LAB
ALBUMIN SERPL-MCNC: 4 G/DL (ref 3.4–5)
ALBUMIN/GLOB SERPL: 1.3 {RATIO} (ref 1–2)
ALP LIVER SERPL-CCNC: 68 U/L
ALT SERPL-CCNC: 40 U/L
ANION GAP SERPL CALC-SCNC: 6 MMOL/L (ref 0–18)
AST SERPL-CCNC: 17 U/L (ref 15–37)
BASOPHILS # BLD AUTO: 0.02 X10(3) UL (ref 0–0.2)
BASOPHILS NFR BLD AUTO: 0.3 %
BILIRUB SERPL-MCNC: 0.9 MG/DL (ref 0.1–2)
BILIRUB UR QL STRIP.AUTO: NEGATIVE
BUN BLD-MCNC: 12 MG/DL (ref 7–18)
CALCIUM BLD-MCNC: 9.4 MG/DL (ref 8.5–10.1)
CHLORIDE SERPL-SCNC: 108 MMOL/L (ref 98–112)
CHOLEST SERPL-MCNC: 209 MG/DL (ref ?–200)
CO2 SERPL-SCNC: 23 MMOL/L (ref 21–32)
COLOR UR AUTO: YELLOW
CREAT BLD-MCNC: 0.85 MG/DL
EGFRCR SERPLBLD CKD-EPI 2021: 94 ML/MIN/1.73M2 (ref 60–?)
EOSINOPHIL # BLD AUTO: 0.17 X10(3) UL (ref 0–0.7)
EOSINOPHIL NFR BLD AUTO: 2.6 %
ERYTHROCYTE [DISTWIDTH] IN BLOOD BY AUTOMATED COUNT: 12.2 %
EST. AVERAGE GLUCOSE BLD GHB EST-MCNC: 111 MG/DL (ref 68–126)
FASTING PATIENT LIPID ANSWER: YES
FASTING STATUS PATIENT QL REPORTED: YES
GLOBULIN PLAS-MCNC: 3.2 G/DL (ref 2.8–4.4)
GLUCOSE BLD-MCNC: 89 MG/DL (ref 70–99)
GLUCOSE UR STRIP.AUTO-MCNC: NEGATIVE MG/DL
HBA1C MFR BLD: 5.5 % (ref ?–5.7)
HCT VFR BLD AUTO: 44.7 %
HDLC SERPL-MCNC: 57 MG/DL (ref 40–59)
HGB BLD-MCNC: 15 G/DL
IMM GRANULOCYTES # BLD AUTO: 0.01 X10(3) UL (ref 0–1)
IMM GRANULOCYTES NFR BLD: 0.2 %
KETONES UR STRIP.AUTO-MCNC: NEGATIVE MG/DL
LDLC SERPL CALC-MCNC: 127 MG/DL (ref ?–100)
LYMPHOCYTES # BLD AUTO: 1.77 X10(3) UL (ref 1–4)
LYMPHOCYTES NFR BLD AUTO: 27.4 %
MCH RBC QN AUTO: 31.4 PG (ref 26–34)
MCHC RBC AUTO-ENTMCNC: 33.6 G/DL (ref 31–37)
MCV RBC AUTO: 93.7 FL
MONOCYTES # BLD AUTO: 0.63 X10(3) UL (ref 0.1–1)
MONOCYTES NFR BLD AUTO: 9.7 %
NEUTROPHILS # BLD AUTO: 3.87 X10 (3) UL (ref 1.5–7.7)
NEUTROPHILS # BLD AUTO: 3.87 X10(3) UL (ref 1.5–7.7)
NEUTROPHILS NFR BLD AUTO: 59.8 %
NITRITE UR QL STRIP.AUTO: NEGATIVE
NONHDLC SERPL-MCNC: 152 MG/DL (ref ?–130)
OSMOLALITY SERPL CALC.SUM OF ELEC: 283 MOSM/KG (ref 275–295)
PH UR STRIP.AUTO: 5 [PH] (ref 5–8)
PLATELET # BLD AUTO: 263 10(3)UL (ref 150–450)
POTASSIUM SERPL-SCNC: 3.8 MMOL/L (ref 3.5–5.1)
PROT SERPL-MCNC: 7.2 G/DL (ref 6.4–8.2)
PROT UR STRIP.AUTO-MCNC: 30 MG/DL
RBC # BLD AUTO: 4.77 X10(6)UL
RBC UR QL AUTO: NEGATIVE
SODIUM SERPL-SCNC: 137 MMOL/L (ref 136–145)
SP GR UR STRIP.AUTO: 1.02 (ref 1–1.03)
T4 FREE SERPL-MCNC: 1.1 NG/DL (ref 0.8–1.7)
TRIGL SERPL-MCNC: 139 MG/DL (ref 30–149)
TSI SER-ACNC: 1.74 MIU/ML (ref 0.36–3.74)
UROBILINOGEN UR STRIP.AUTO-MCNC: <2 MG/DL
VLDLC SERPL CALC-MCNC: 25 MG/DL (ref 0–30)
WBC # BLD AUTO: 6.5 X10(3) UL (ref 4–11)

## 2023-07-27 PROCEDURE — 85025 COMPLETE CBC W/AUTO DIFF WBC: CPT | Performed by: INTERNAL MEDICINE

## 2023-07-27 PROCEDURE — 80053 COMPREHEN METABOLIC PANEL: CPT | Performed by: INTERNAL MEDICINE

## 2023-07-27 PROCEDURE — 80061 LIPID PANEL: CPT | Performed by: INTERNAL MEDICINE

## 2023-07-27 PROCEDURE — 36415 COLL VENOUS BLD VENIPUNCTURE: CPT | Performed by: INTERNAL MEDICINE

## 2023-07-27 PROCEDURE — 84443 ASSAY THYROID STIM HORMONE: CPT | Performed by: INTERNAL MEDICINE

## 2023-07-27 PROCEDURE — 83036 HEMOGLOBIN GLYCOSYLATED A1C: CPT | Performed by: INTERNAL MEDICINE

## 2023-07-27 PROCEDURE — 84439 ASSAY OF FREE THYROXINE: CPT | Performed by: INTERNAL MEDICINE

## 2023-07-27 PROCEDURE — 81001 URINALYSIS AUTO W/SCOPE: CPT | Performed by: INTERNAL MEDICINE

## 2023-07-28 ENCOUNTER — TELEMEDICINE (OUTPATIENT)
Dept: INTERNAL MEDICINE CLINIC | Facility: CLINIC | Age: 33
End: 2023-07-28

## 2023-07-28 DIAGNOSIS — F41.1 GAD (GENERALIZED ANXIETY DISORDER): ICD-10-CM

## 2023-07-28 DIAGNOSIS — E66.9 OBESITY (BMI 30-39.9): Primary | ICD-10-CM

## 2023-07-29 ENCOUNTER — EKG ENCOUNTER (OUTPATIENT)
Dept: LAB | Age: 33
End: 2023-07-29
Attending: INTERNAL MEDICINE
Payer: COMMERCIAL

## 2023-07-29 DIAGNOSIS — F41.1 GAD (GENERALIZED ANXIETY DISORDER): ICD-10-CM

## 2023-07-29 LAB
ATRIAL RATE: 62 BPM
P AXIS: 35 DEGREES
P-R INTERVAL: 172 MS
Q-T INTERVAL: 412 MS
QRS DURATION: 80 MS
QTC CALCULATION (BEZET): 418 MS
R AXIS: 58 DEGREES
T AXIS: 21 DEGREES
VENTRICULAR RATE: 62 BPM

## 2023-07-29 PROCEDURE — 93010 ELECTROCARDIOGRAM REPORT: CPT | Performed by: INTERNAL MEDICINE

## 2023-07-29 PROCEDURE — 81001 URINALYSIS AUTO W/SCOPE: CPT | Performed by: INTERNAL MEDICINE

## 2023-07-29 PROCEDURE — 87086 URINE CULTURE/COLONY COUNT: CPT | Performed by: INTERNAL MEDICINE

## 2023-07-29 PROCEDURE — 93005 ELECTROCARDIOGRAM TRACING: CPT

## 2023-07-31 ENCOUNTER — TELEPHONE (OUTPATIENT)
Dept: INTERNAL MEDICINE CLINIC | Facility: CLINIC | Age: 33
End: 2023-07-31

## 2023-07-31 NOTE — TELEPHONE ENCOUNTER
Dr Moss Gosselin: patient had a video visit with you 7/28/23    1) she asked if she can cut phentermine in half? She has 37.5mg.     2) patient asking if you can recommend female weight loss provider. She states she is unable to get in soon with Dr Olivier Escalera. She mentioned you were going to \"ask\" another weight loss provider so she can get appt this week. Do you recall?

## 2023-07-31 NOTE — PROGRESS NOTES
Virtual Telephone Check-In    Henry Yeung [address] verbally declines a Virtual/Telephone Check-In visit on 07/30/23. Patient has been referred to the [facility] website at [email or web address] to review the yearly Consent to Treat document. Patient understands and accepts financial responsibility for any deductible, co-insurance and/or co-pays associated with this service. Duration of the service: 30 minutes      Summary of topics discussed: chantale shoemaker  Called would like prescription for Juan F Santana MD        HPI:    Patient ID: Naseem Lopez is a 34year old female. HPI    Pt would like a prescription for [facility]  She exericses everyday   Stays on low calorie diet  Spoke to a friend  re wt loss meds  She reaseached wt loss med and would like munaro    I advise munjaro not indicated for weight loss  Used in diabetic   can help with wt loss    Decline ozempic read about it does not want ozempic or similar    Hx of anxiety  she is on lexapro  Have not had counseling per pt she is doing well on med  Occasionally take alprazolam  Not interested in counseling      There were no vitals taken for this visit. Wt Readings from Last 6 Encounters:  09/07/22 : 195 lb (88.5 kg)  09/14/21 : 185 lb (83.9 kg)  04/19/21 : 190 lb (86.2 kg)  04/12/21 : 191 lb (86.6 kg)  06/08/17 : 163 lb (73.9 kg)    There is no height or weight on file to calculate BMI. HGBA1C:    Lab Results   Component Value Date    A1C 5.5 07/27/2023     07/27/2023         Review of Systems      Current Outpatient Medications   Medication Sig Dispense Refill    Phentermine HCl 37.5 MG Oral Tab Take 1 tablet (37.5 mg total) by mouth every morning before breakfast. 30 tablet 0    escitalopram (LEXAPRO) 10 MG Oral Tab Take 1 tablet (10 mg total) by mouth daily. 90 tablet 1    ALPRAZolam (XANAX) 0.25 MG Oral Tab Take 1 tablet (0.25 mg total) by mouth 2 (two) times daily as needed for Anxiety.  60 tablet 0    escitalopram 5 MG Oral Tab Take 1 tablet (5 mg total) by mouth daily. 90 tablet 3    Levonorgestrel (LILETTA, 52 MG,) 20.1 MCG/DAY Intrauterine IUD 1 each by Intrauterine route one time. Fexofenadine HCl (ALLEGRA ALLERGY OR) 180 mg.        diphenhydrAMINE HCl (BENADRYL ALLERGY OR)       Bacillus Coagulans-Inulin (PROBIOTIC) 1-250 BILLION-MG Oral Cap Take by mouth.        Allergies:  Codeine                 ANAPHYLAXIS  Seasonal                OTHER (SEE COMMENTS)    Comment:Itchy, water eyes, coughing, sneezing, sometimes             skin rashes    HISTORY:  Past Medical History:   Diagnosis Date    Anxiety     Appendicitis 2002    Arm fracture     Chlamydia 2012    History of nasal septoplasty 2008    Sexually transmitted disease     trich      Past Surgical History:   Procedure Laterality Date    APPENDECTOMY  2001    APPENDECTOMY      INSERT INTRAUTERINE DEVICE      OTHER SURGICAL HISTORY  2008    Turbinate reduction/SMR    REMOVE INTRAUTERINE DEVICE      TONSILLECTOMY  2008    per NG - T&A      Family History   Problem Relation Age of Onset    Diabetes Maternal Grandfather     Heart Disease Paternal Grandfather         Coronary artery disease     Thyroid Disorder Mother     Hypertension Mother     Thyroid Disorder Maternal Grandmother     Thyroid Disorder Maternal Aunt     Heart Disorder Father       Social History:   Social History     Socioeconomic History    Marital status: Single   Tobacco Use    Smoking status: Never    Smokeless tobacco: Never   Vaping Use    Vaping Use: Never used   Substance and Sexual Activity    Alcohol use: Not Currently     Alcohol/week: 0.0 standard drinks of alcohol     Comment: Social.     Drug use: Never    Sexual activity: Yes     Partners: Male     Birth control/protection: Paragard     Comment: 10/2013        PHYSICAL EXAM:    Physical Exam         ASSESSMENT/PLAN:   (E66.9) Obesity (BMI 30-39.9)  (primary encounter diagnosis)  Plan: gaining weight  She is very upset about her weight gain  She is really trying with exercise and limted caloric intake  Discusse  need for EKG and Blood pressure reading   Can try phentermine for a start  Advised to follow up with wt loss clinic    (F41.1) AUREA (generalized anxiety disorder)  Plan: EKG 12-LEAD        Chronic per pt conntrolled      Note:  no charge for this visit  Technical difficulty   Mychart connection  no sound from her end i tried to conect 3 times  Doximty able to connect   poor connnection   Voice telephone calll very choppy   Often talking together  pt was crying   Thinks she is not being heart  I ordered EKG  she will report he bP with me if normal  Will give phentermine    Addendum Sunday EKG normal BP less than 140/90   I called pt and adivsed to take pehnetemine in AM  Cont diet and exericse   Follow up with  wt loss management MD    Patient voiced understanding  and agrees with plan  Voiced her appreciation        Meds This Visit:  Requested Prescriptions      No prescriptions requested or ordered in this encounter       Imaging & Referrals:  None        RO#[id number]

## 2023-08-01 NOTE — TELEPHONE ENCOUNTER
Spoke with patient,  verified. Gave her Dr Chriss Garcia instructions. Booked consult with Dr Dioni Escudero for weight loss. Patient agreed to plan.   Future Appointments   Date Time Provider Dari Davis   2023  9:20 AM Geronimo Valera MD [facility]   2023  8:20 AM MD BINH Villa SURJIT ANTONO

## 2023-08-01 NOTE — TELEPHONE ENCOUNTER
Sched with DR Brenda Layton next week  I started pt on phentermine  Try that this week    If she wants to phentermine in half its ok  Continue dietary and lifestyle change   exercise

## 2023-08-21 ENCOUNTER — PATIENT MESSAGE (OUTPATIENT)
Dept: INTERNAL MEDICINE CLINIC | Facility: CLINIC | Age: 33
End: 2023-08-21

## 2023-08-21 DIAGNOSIS — E66.9 OBESITY, CLASS I, BMI 30-34.9: ICD-10-CM

## 2023-08-21 PROBLEM — E66.811 OBESITY, CLASS I, BMI 30-34.9: Status: ACTIVE | Noted: 2023-08-21

## 2023-08-21 NOTE — TELEPHONE ENCOUNTER
From: Eran Villalba  To: Andriy Meredith MD  Sent: 8/21/2023 5:26 PM CDT  Subject: Approval    Hi team, my pharmacist notified me that my prescription is waiting on approval. Please send your approval for .  Thank you

## 2023-08-22 RX ORDER — TOPIRAMATE 25 MG/1
25 TABLET ORAL 2 TIMES DAILY
Qty: 180 TABLET | Refills: 0 | OUTPATIENT
Start: 2023-08-22

## 2023-08-22 RX ORDER — TOPIRAMATE 25 MG/1
25 TABLET ORAL 2 TIMES DAILY
Qty: 180 TABLET | Refills: 0 | Status: SHIPPED | OUTPATIENT
Start: 2023-08-22 | End: 2023-11-20

## 2023-09-19 ENCOUNTER — TELEMEDICINE (OUTPATIENT)
Dept: INTERNAL MEDICINE CLINIC | Facility: CLINIC | Age: 33
End: 2023-09-19

## 2023-09-19 ENCOUNTER — TELEPHONE (OUTPATIENT)
Dept: INTERNAL MEDICINE CLINIC | Facility: CLINIC | Age: 33
End: 2023-09-19

## 2023-09-19 DIAGNOSIS — F41.1 GAD (GENERALIZED ANXIETY DISORDER): ICD-10-CM

## 2023-09-19 DIAGNOSIS — E78.2 MODERATE MIXED HYPERLIPIDEMIA NOT REQUIRING STATIN THERAPY: ICD-10-CM

## 2023-09-19 DIAGNOSIS — Z51.81 THERAPEUTIC DRUG MONITORING: ICD-10-CM

## 2023-09-19 DIAGNOSIS — F50.81 BINGE EATING DISORDER: ICD-10-CM

## 2023-09-19 DIAGNOSIS — E66.9 OBESITY, CLASS I, BMI 30-34.9: Primary | ICD-10-CM

## 2023-09-19 DIAGNOSIS — G43.019 INTRACTABLE MIGRAINE WITHOUT AURA AND WITHOUT STATUS MIGRAINOSUS: ICD-10-CM

## 2023-09-19 PROCEDURE — 99214 OFFICE O/P EST MOD 30 MIN: CPT | Performed by: INTERNAL MEDICINE

## 2023-09-19 RX ORDER — PHENTERMINE HYDROCHLORIDE 8 MG/1
1 TABLET ORAL
Qty: 90 TABLET | Refills: 0 | Status: SHIPPED | OUTPATIENT
Start: 2023-09-19 | End: 2023-10-19

## 2023-09-19 NOTE — TELEPHONE ENCOUNTER
Received fax from pharmacy requesting PA        Phentermine HCl (LOMAIRA) 8 MG Oral Tab, Take 1 tablet by mouth 3 (three) times daily before meals. , Disp: 90 tablet, Rfl: 0    KEY  [id number]

## 2023-09-19 NOTE — PROGRESS NOTES
Patient ID: Winnie Sparks is a 34year old female. Patient presents with:  Weight Loss    HISTORY OF PRESENT ILLNESS:   Patient presents for above. This visit is conducted using Telemedicine with live, interactive video and audio. Topiramate is causing extreme brain fog, tiredness    Initial weight: 196 lbs 8/21/2023  Current weight: 190 lbs  Interval weight loss: 6 lbs  Total weight loss: 6 lbs      Review of Systems   MEDICAL HISTORY:     Past Medical History:   Diagnosis Date    Anxiety     Appendicitis 2002    Arm fracture     Chlamydia 2012    History of nasal septoplasty 2008    Sexually transmitted disease     trich       Past Surgical History:   Procedure Laterality Date    APPENDECTOMY  2001    APPENDECTOMY      INSERT INTRAUTERINE DEVICE      OTHER SURGICAL HISTORY  2008    Turbinate reduction/SMR    REMOVE INTRAUTERINE DEVICE      TONSILLECTOMY  2008    per NG - T&A         Current Outpatient Medications:     Phentermine HCl 15 MG Oral Cap, Take 1 capsule (15 mg total) by mouth every morning., Disp: 30 capsule, Rfl: 0    escitalopram (LEXAPRO) 10 MG Oral Tab, Take 1 tablet (10 mg total) by mouth daily. , Disp: 90 tablet, Rfl: 1    ALPRAZolam (XANAX) 0.25 MG Oral Tab, Take 1 tablet (0.25 mg total) by mouth 2 (two) times daily as needed for Anxiety. , Disp: 60 tablet, Rfl: 0    Levonorgestrel (LILETTA, 52 MG,) 20.1 MCG/DAY Intrauterine IUD, 1 each by Intrauterine route one time. , Disp: , Rfl:     Fexofenadine HCl (ALLEGRA ALLERGY OR), 180 mg.  , Disp: , Rfl:     diphenhydrAMINE HCl (BENADRYL ALLERGY OR), , Disp: , Rfl:     Bacillus Coagulans-Inulin (PROBIOTIC) 1-250 BILLION-MG Oral Cap, Take by mouth., Disp: , Rfl:     Allergies:  Codeine                 ANAPHYLAXIS  Seasonal                OTHER (SEE COMMENTS)    Comment:Itchy, water eyes, coughing, sneezing, sometimes             skin rashes      Social History    Socioeconomic History      Marital status:       Spouse name: Not on file Number of children: Not on file      Years of education: Not on file      Highest education level: Not on file    Occupational History      Not on file    Tobacco Use      Smoking status: Never      Smokeless tobacco: Never    Vaping Use      Vaping Use: Never used    Substance and Sexual Activity      Alcohol use: Not Currently        Alcohol/week: 0.0 standard drinks of alcohol        Comment: Social.       Drug use: Never      Sexual activity: Yes        Partners: Male        Birth control/protection: Paragard        Comment: 10/2013    Other Topics      Concerns:        Not on file    Social History Narrative      Not on file    Social Determinants of Health  Financial Resource Strain: Not on file  Food Insecurity: Not on file  Transportation Needs: Not on file  Physical Activity: Not on file  Stress: Not on file  Social Connections: Not on file  Housing Stability: Not on file    PHYSICAL EXAM:   Unable to perform vitals or do physical exam as this is a virtual video visit. Patient appears alert. No conversational dyspnea or distress. ASSESSMENT/PLAN:   1. Obesity, Class I, BMI 30-34.9    2. AUREA (generalized anxiety disorder)    3. Intractable migraine without aura and without status migrainosus    4. Binge eating disorder    5. Moderate mixed hyperlipidemia not requiring statin therapy    6.  Therapeutic drug monitoring  Initial weight 203 lbs, BMI 32  -labs 7/2023 A1c 5.5, cholesterol 209,   -Pending Wegovy 4  -family history of hypothyroidism, but no cancer  -father with premature CAD at 43years old  -patient has an IUD  -Started by Dr. Bonilla Vu on phentermine 37.5 mg, tolerated okay, had palpitations with work out (  on heart monitor) and was difficult for her to sleep  -Tried phentermine 15 mg, but feels it's still a bit too stimulating for her  -Topiramate is causing extreme brain fog, tiredness, stopped    Plan:    - Switch to Lomaira 8 mg TID  -Discussed Kcal goals <1800 Kcal, carbohydrate goal <100 grams  -Exercise goal 1 hour daily  -Stress management 10 minutes meditation before bedtime  -Drink at least 64 oz water daily      No follow-ups on file. Time spent on encounter  30 minutes   Video time 15 minutes   Documentation time 10 minutes     Derrick Villalba understands video evaluation is not a substitute for face-to-face examination or emergency care. Patient advised to go to ER or call 911 for worsening symptoms or acute distress. Telehealth outside of [facility] Insurance Verbal Consent   I conducted a telehealth visit with Derrick Villalba today, 09/19/23, which was completed using two-way, real-time interactive audio and video communication. This has been done in good haylie to provide continuity of care in the best interest of the provider-patient relationship, due to the COVID -19 public health crisis/national emergency where restrictions of face-to-face office visits are ongoing. Every conscious effort was taken to allow for sufficient and adequate time to complete the visit. The patient was made aware of the limitations of the telehealth visit, including treatment limitations as no physical exam could be performed. The patient was advised to call 911 or to go to the ER in case there was an emergency. The patient was also advised of the potential privacy & security concerns related to the telehealth platform. The patient was made aware of where to find [facility] notice of privacy practices, telehealth consent form and other related consent forms and documents. which are located on the [facility] website. The patient verbally agreed to telehealth consent form, related consents and the risks discussed. Lastly, the patient confirmed that they were in [place]. Included in this visit, time may have been spent reviewing labs, medications, radiology tests and decision making. Appropriate medical decision-making and tests are ordered as detailed in the plan of care above. Coding/billing information is submitted for this visit based on complexity of care and/or time spent for the visit. This note was prepared using [employer] voice recognition dictation software. As a result errors may occur. When identified these errors have been corrected.  While every attempt is made to correct errors during dictation discrepancies may still exist.    Nilda Meyers MD  9/19/2023

## 2023-09-21 NOTE — TELEPHONE ENCOUNTER
Approved  [id number]  Prior authorization approved   Case ID: [id number]      Payer: [employer]   Request Reference Number: [id number]. Otto Enamorado is approved through 03/21/2024. Your patient may now fill this prescription and it will be covered.    Approval Details    Authorization number: [id number]   Authorized from September 21, 2023 to March 21, 2024      Electronic appeal: Not supported   View History

## 2023-10-04 ENCOUNTER — OFFICE VISIT (OUTPATIENT)
Dept: INTERNAL MEDICINE CLINIC | Facility: CLINIC | Age: 33
End: 2023-10-04

## 2023-10-04 VITALS
BODY MASS INDEX: 30.9 KG/M2 | DIASTOLIC BLOOD PRESSURE: 85 MMHG | HEIGHT: 65.75 IN | HEART RATE: 108 BPM | SYSTOLIC BLOOD PRESSURE: 117 MMHG | WEIGHT: 190 LBS

## 2023-10-04 DIAGNOSIS — F41.1 GAD (GENERALIZED ANXIETY DISORDER): ICD-10-CM

## 2023-10-04 DIAGNOSIS — Z00.00 PHYSICAL EXAM: Primary | ICD-10-CM

## 2023-10-04 DIAGNOSIS — E66.9 OBESITY, CLASS I, BMI 30-34.9: ICD-10-CM

## 2023-10-04 PROCEDURE — 99395 PREV VISIT EST AGE 18-39: CPT | Performed by: INTERNAL MEDICINE

## 2023-10-04 PROCEDURE — 3008F BODY MASS INDEX DOCD: CPT | Performed by: INTERNAL MEDICINE

## 2023-10-04 PROCEDURE — 3079F DIAST BP 80-89 MM HG: CPT | Performed by: INTERNAL MEDICINE

## 2023-10-04 PROCEDURE — 3074F SYST BP LT 130 MM HG: CPT | Performed by: INTERNAL MEDICINE

## 2023-10-25 ENCOUNTER — PATIENT MESSAGE (OUTPATIENT)
Dept: INTERNAL MEDICINE CLINIC | Facility: CLINIC | Age: 33
End: 2023-10-25

## 2023-10-26 NOTE — TELEPHONE ENCOUNTER
From: Anika Villalba  To: Fadi Tomasesther  Sent: 10/25/2023 10:05 AM CDT  Subject: Diethylpropin Request     Hi Doctor Obi Pretty,     Per our last visit. I would like to try Diethylpropin to continue my weight loss progress. The 8mg of Lomaira isnt changing my appetite or weight loss, still holding at 190 with all my efforts.      Thank you,

## 2023-10-31 RX ORDER — DIETHYLPROPION HYDROCHLORIDE 25 MG/1
1 TABLET ORAL
Qty: 60 TABLET | Refills: 0 | Status: SHIPPED | OUTPATIENT
Start: 2023-10-31 | End: 2023-11-30

## 2023-12-18 ENCOUNTER — PATIENT MESSAGE (OUTPATIENT)
Dept: INTERNAL MEDICINE CLINIC | Facility: CLINIC | Age: 33
End: 2023-12-18

## 2023-12-18 ENCOUNTER — TELEPHONE (OUTPATIENT)
Dept: INTERNAL MEDICINE CLINIC | Facility: CLINIC | Age: 33
End: 2023-12-18

## 2023-12-18 DIAGNOSIS — F50.81 BINGE EATING DISORDER: ICD-10-CM

## 2023-12-18 NOTE — PROGRESS NOTES
Patient ID: Tonia Solano is a 34year old female. No chief complaint on file. HISTORY OF PRESENT ILLNESS:   Patient presents for above. This visit is conducted using Telemedicine with live, interactive video and audio. Patient is struggling with weight loss. Failed phentermine, diethyl, lomaira, topamax due to side effects, palpitations and brain fog    Initial weight: 203 lbs 8/21/2023  Current weight: 195 lbs  Interval weight loss: 0 lbs  Total weight loss: 8 lbs      Review of Systems   Constitutional:  Negative for activity change, appetite change, diaphoresis, fatigue and fever. HENT:  Negative for mouth sores, postnasal drip, rhinorrhea and sore throat. Respiratory:  Negative for cough, choking, shortness of breath and stridor. Gastrointestinal:  Negative for blood in stool, diarrhea, nausea and rectal pain. Endocrine: Negative for cold intolerance and heat intolerance. Genitourinary:  Negative for genital sores, hematuria and pelvic pain. Musculoskeletal:  Negative for back pain, joint swelling, myalgias and neck pain. Neurological:  Negative for dizziness, seizures, facial asymmetry, speech difficulty, light-headedness and headaches. Psychiatric/Behavioral:  Negative for hallucinations, self-injury and sleep disturbance. The patient is not nervous/anxious and is not hyperactive.        MEDICAL HISTORY:     Past Medical History:   Diagnosis Date    Anxiety     Appendicitis 2002    Arm fracture     Chlamydia 2012    History of nasal septoplasty 2008    Sexually transmitted disease     trich       Past Surgical History:   Procedure Laterality Date    APPENDECTOMY  2001    APPENDECTOMY      INSERT INTRAUTERINE DEVICE      OTHER SURGICAL HISTORY  2008    Turbinate reduction/SMR    REMOVE INTRAUTERINE DEVICE      TONSILLECTOMY  2008    per NG - T&A         Current Outpatient Medications:     lisdexamfetamine 30 MG Oral Cap, Take 1 capsule (30 mg total) by mouth every morning., Disp: 30 capsule, Rfl: 0    metFORMIN  MG Oral Tablet 24 Hr, Take 2 tablets (1,000 mg total) by mouth 2 (two) times daily with meals. , Disp: 120 tablet, Rfl: 1    ALPRAZolam (XANAX) 0.25 MG Oral Tab, Take 1 tablet (0.25 mg total) by mouth 2 (two) times daily as needed for Anxiety. , Disp: 60 tablet, Rfl: 0    Levonorgestrel (LILETTA, 52 MG,) 20.1 MCG/DAY Intrauterine IUD, 1 each by Intrauterine route one time. , Disp: , Rfl:     Fexofenadine HCl (ALLEGRA ALLERGY OR), 180 mg.  , Disp: , Rfl:     diphenhydrAMINE HCl (BENADRYL ALLERGY OR), , Disp: , Rfl:     Bacillus Coagulans-Inulin (PROBIOTIC) 1-250 BILLION-MG Oral Cap, Take by mouth., Disp: , Rfl:     Allergies: Allergies   Allergen Reactions    Codeine ANAPHYLAXIS    Seasonal OTHER (SEE COMMENTS)     Itchy, water eyes, coughing, sneezing, sometimes skin rashes         Social History     Socioeconomic History    Marital status:      Spouse name: Not on file    Number of children: Not on file    Years of education: Not on file    Highest education level: Not on file   Occupational History    Not on file   Tobacco Use    Smoking status: Never     Passive exposure: Never    Smokeless tobacco: Never   Vaping Use    Vaping Use: Never used   Substance and Sexual Activity    Alcohol use: Not Currently     Alcohol/week: 0.0 standard drinks of alcohol     Comment: Social.     Drug use: Never    Sexual activity: Yes     Partners: Male     Birth control/protection: Paragard     Comment: 10/2013   Other Topics Concern    Not on file   Social History Narrative    Not on file     Social Determinants of Health     Financial Resource Strain: Not on file   Food Insecurity: Not on file   Transportation Needs: Not on file   Physical Activity: Not on file   Stress: Not on file   Social Connections: Not on file   Housing Stability: Not on file       PHYSICAL EXAM:   Unable to perform vitals or do physical exam as this is a virtual video visit. Patient appears alert.   No conversational dyspnea or distress. ASSESSMENT/PLAN:   1. Intractable migraine without aura and without status migrainosus  2. Binge eating  3. Obesity, Class I, BMI 30-34.9  4. AUREA (generalized anxiety disorder)  5. Moderate mixed hyperlipidemia not requiring statin therapy  6. Therapeutic drug monitoring    Initial weight 203 lbs, BMI 32  -labs 7/2023 A1c 5.5, cholesterol 209,   -Pending Wegovy 4  -family history of hypothyroidism, but no cancer  -father with premature CAD at 43years old  -patient has an IUD  -Started by Dr. Frances Kuhn on phentermine 37.5 mg, tolerated okay, had palpitations with work out (  on heart monitor) and was difficult for her to sleep  -Tried phentermine 15 mg, but feels it's still a bit too stimulating for her  -tried lomaira, without benefit  -Tried diethylpropion without weight loss effect  -Topiramate is causing extreme brain fog, tiredness, stopped    Plan:  -  start Lisdexamphetamine 30 mg daily for binge eating  - metformin 1000 mg BID  -Discussed Kcal goals <1800 Kcal, carbohydrate goal <100 grams  -Exercise goal 1 hour daily  -Stress management 10 minutes meditation before bedtime  -Drink at least 64 oz water daily      Return in about 2 months (around 2/18/2024). Time spent on encounter  30 minutes   Video time 15 minutes   Documentation time 10 minutes     Ish Villalba understands video evaluation is not a substitute for face-to-face examination or emergency care. Patient advised to go to ER or call 911 for worsening symptoms or acute distress. Telehealth outside of [facility] [employer] Verbal Consent   I conducted a telehealth visit with Ish Villalba today, 09/19/23, which was completed using two-way, real-time interactive audio and video communication.  This has been done in good haylie to provide continuity of care in the best interest of the provider-patient relationship, due to the COVID -19 public health crisis/national emergency where restrictions of face-to-face office visits are ongoing. Every conscious effort was taken to allow for sufficient and adequate time to complete the visit. The patient was made aware of the limitations of the telehealth visit, including treatment limitations as no physical exam could be performed. The patient was advised to call 911 or to go to the ER in case there was an emergency. The patient was also advised of the potential privacy & security concerns related to the telehealth platform. The patient was made aware of where to find [facility] notice of privacy practices, telehealth consent form and other related consent forms and documents. which are located on the [facility] website. The patient verbally agreed to telehealth consent form, related consents and the risks discussed. Lastly, the patient confirmed that they were in [place]. Included in this visit, time may have been spent reviewing labs, medications, radiology tests and decision making. Appropriate medical decision-making and tests are ordered as detailed in the plan of care above. Coding/billing information is submitted for this visit based on complexity of care and/or time spent for the visit. This note was prepared using [employer] [facility] [employer] voice recognition dictation software. As a result errors may occur. When identified these errors have been corrected.  While every attempt is made to correct errors during dictation discrepancies may still exist.    Bubba Carmen MD

## 2023-12-18 NOTE — TELEPHONE ENCOUNTER
My pharmacy called me lisdexamfetamine 30 MG Caps is completely out of stock and on back order till further notice in surrounding area. The non generic option is $300+  Any other suggestions?

## 2023-12-19 ENCOUNTER — TELEPHONE (OUTPATIENT)
Dept: INTERNAL MEDICINE CLINIC | Facility: CLINIC | Age: 33
End: 2023-12-19

## 2023-12-19 RX ORDER — LISDEXAMFETAMINE DIMESYLATE CAPSULES 30 MG/1
30 CAPSULE ORAL EVERY MORNING
Qty: 30 CAPSULE | Refills: 0 | Status: SHIPPED | OUTPATIENT
Start: 2023-12-19 | End: 2024-01-18

## 2023-12-19 NOTE — TELEPHONE ENCOUNTER
Per patient , she would like her rx med lisdexamfetamine transferred to . Dena Arias 26 on file below due to the one that it was sent is out of stock.

## 2023-12-20 ENCOUNTER — PATIENT MESSAGE (OUTPATIENT)
Dept: INTERNAL MEDICINE CLINIC | Facility: CLINIC | Age: 33
End: 2023-12-20

## 2023-12-21 RX ORDER — ONDANSETRON 4 MG/1
4 TABLET, ORALLY DISINTEGRATING ORAL EVERY 8 HOURS PRN
Qty: 12 TABLET | Refills: 0 | Status: SHIPPED | OUTPATIENT
Start: 2023-12-21 | End: 2023-12-28

## 2023-12-21 NOTE — TELEPHONE ENCOUNTER
From: Minda Villalba  To: Ryan Armstrong  Sent: 12/20/2023 12:12 PM CST  Subject: Gi Christensen + Team,   As mentioned I am leaving in 2 days for [address] [place] [address] for [place]. I'm on day 2 of Metformin and experiencing some nausea. Could you send me a script for Zofran to help with this symptom's while traveling.      Thank you in advance,   Carrol Campos

## 2023-12-21 NOTE — TELEPHONE ENCOUNTER
Patient calling back to check status of zofran requested. Advised message was previously sent to provider; however due to time sensitivity message would be sent again.

## 2023-12-30 ENCOUNTER — PATIENT MESSAGE (OUTPATIENT)
Dept: INTERNAL MEDICINE CLINIC | Facility: CLINIC | Age: 33
End: 2023-12-30

## 2023-12-30 NOTE — TELEPHONE ENCOUNTER
From: Katelyn Villalba  To: Erica Bustamante  Sent: 12/30/2023 9:22 AM CST  Subject: Mariposa Villatoro,  I unfortunately caught Covid during my holiday travels. Any suggestions or medication to help me recover?

## 2024-02-05 ENCOUNTER — TELEPHONE (OUTPATIENT)
Dept: OBGYN CLINIC | Facility: CLINIC | Age: 34
End: 2024-02-05

## 2024-02-05 NOTE — TELEPHONE ENCOUNTER
Pt self scheduled appt for 2/14 for physical and IUD removal. In self schedule message, Pt wanted to confirm IUD could be removed at appt. Please advise.

## 2024-02-05 NOTE — TELEPHONE ENCOUNTER
EMB- okay for IUD removal at annual? Pt planning to TTC.     Pt only needs call if not appriopriate.

## 2024-02-14 ENCOUNTER — OFFICE VISIT (OUTPATIENT)
Dept: OBGYN CLINIC | Facility: CLINIC | Age: 34
End: 2024-02-14
Payer: COMMERCIAL

## 2024-02-14 VITALS
HEART RATE: 87 BPM | DIASTOLIC BLOOD PRESSURE: 87 MMHG | SYSTOLIC BLOOD PRESSURE: 137 MMHG | BODY MASS INDEX: 32 KG/M2 | WEIGHT: 197 LBS

## 2024-02-14 DIAGNOSIS — Z01.419 WELL WOMAN EXAM WITH ROUTINE GYNECOLOGICAL EXAM: Primary | ICD-10-CM

## 2024-02-14 DIAGNOSIS — Z30.432 ENCOUNTER FOR IUD REMOVAL: ICD-10-CM

## 2024-02-14 DIAGNOSIS — Z31.9 PATIENT DESIRES PREGNANCY: ICD-10-CM

## 2024-02-14 PROCEDURE — 3079F DIAST BP 80-89 MM HG: CPT | Performed by: NURSE PRACTITIONER

## 2024-02-14 PROCEDURE — 99395 PREV VISIT EST AGE 18-39: CPT | Performed by: NURSE PRACTITIONER

## 2024-02-14 PROCEDURE — 3075F SYST BP GE 130 - 139MM HG: CPT | Performed by: NURSE PRACTITIONER

## 2024-02-14 PROCEDURE — 58301 REMOVE INTRAUTERINE DEVICE: CPT | Performed by: NURSE PRACTITIONER

## 2024-02-14 NOTE — PROCEDURES
IUD Removal     Birth control method(s) used: liletta  Consent signed.  Procedure discussed with the patient in detail including indication, risks, benefits, alternatives and complications.      Procedure:  Speculum placed in the vagina.  Betadine wash of vagina and cervix.  An Endocervical speculum was used to visualize strings.  An Allis clamp used to grasp IUD strings.    tresetta  IUD was removed without difficulty.  The patient tolerated the procedure well.      Visit Plan:  Discharge instructions were reviewed with the patient.  Rev'd when to expect menses

## 2024-02-14 NOTE — PROGRESS NOTES
[facility]    Obstetrics and Gynecology    Chief Complaint   Patient presents with    Gyn Problem     IUD REMOVAL       Estefany Villalba is a 34 year old female  No LMP recorded. (Menstrual status: IUD - Intrauterine Device). presenting for annual gynecology exam. Last seen in 2022.    [facility], [facility]. No periods on IUD. Desires removal today, desires to try for pregnancy     She is sexually active with her . Just  in . No pelvic pain. No concerns with intercourse.   Exercising regularly  Seeing weight  loss specialist. Lost 15# and regained. Stopped metformin and phentermine. She is weight training 3-5 times a week.     Pap: 2022 NILM, neg HPV   2020 NILM, neg HPV  (care everywhere)   DIXON; colpo 2018- CHAO 1  Contraception:liletta IUD  Mammo: n/a      OBSTETRICS HISTORY:  OB History    Para Term  AB Living   0 0 0 0 0 0   SAB IAB Ectopic Multiple Live Births   0 0 0 0 0       GYNE HISTORY:  Menarche: 15 (2024 11:02 AM)  Use of Birth Control (if yes, specify type): Liletta IUD (2024 11:02 AM)  Pap Date: 22 (2024 11:02 AM)  Pap Result Notes: NEG PAP / NEG HPV (2024 11:02 AM)      History   Sexual Activity    Sexual activity: Yes    Partners: Male    Birth control/ protection: Paragard     Comment: 10/2013           Latest Ref Rng & Units 2022     9:16 AM   RECENT PAP RESULTS   Thinprep Pap Negative for intraepithelial lesion or malignancy Negative for intraepithelial lesion or malignancy    HPV Negative Negative          MEDICAL HISTORY:  Past Medical History:   Diagnosis Date    Anxiety     Appendicitis 2002    Arm fracture     Chlamydia 2012    History of nasal septoplasty 2008    Sexually transmitted disease     trich     Past Surgical History:   Procedure Laterality Date    APPENDECTOMY  2001    APPENDECTOMY      INSERT INTRAUTERINE DEVICE      OTHER SURGICAL HISTORY  2008    Turbinate  reduction/SMR    REMOVE INTRAUTERINE DEVICE      TONSILLECTOMY  2008    per NG - T&A       SOCIAL HISTORY:  Social History     Socioeconomic History    Marital status:      Spouse name: Not on file    Number of children: Not on file    Years of education: Not on file    Highest education level: Not on file   Occupational History    Not on file   Tobacco Use    Smoking status: Never     Passive exposure: Never    Smokeless tobacco: Never   Vaping Use    Vaping Use: Never used   Substance and Sexual Activity    Alcohol use: Not Currently     Alcohol/week: 0.0 standard drinks of alcohol     Comment: Social.     Drug use: Never    Sexual activity: Yes     Partners: Male     Birth control/protection: Paragard     Comment: 10/2013   Other Topics Concern    Not on file   Social History Narrative    Not on file     Social Determinants of Health     Financial Resource Strain: Not on file   Food Insecurity: Not on file   Transportation Needs: Not on file   Physical Activity: Not on file   Stress: Not on file   Social Connections: Not on file   Housing Stability: Not on file         Depression Screening (PHQ-2/PHQ-9): Over the LAST 2 WEEKS   Little interest or pleasure in doing things: Not at all    Feeling down, depressed, or hopeless: Not at all    PHQ-2 SCORE: 0           FAMILY HISTORY:  Family History   Problem Relation Age of Onset    Diabetes Maternal Grandfather     Heart Disease Paternal Grandfather         Coronary artery disease     Thyroid Disorder Mother     Hypertension Mother     Thyroid Disorder Maternal Grandmother     Thyroid Disorder Maternal Aunt     Heart Disorder Father        MEDICATIONS:    Current Outpatient Medications:     ALPRAZolam (XANAX) 0.25 MG Oral Tab, Take 1 tablet (0.25 mg total) by mouth 2 (two) times daily as needed for Anxiety., Disp: 60 tablet, Rfl: 0    Levonorgestrel (LILETTA, 52 MG,) 20.1 MCG/DAY Intrauterine IUD, 1 each by Intrauterine route one time., Disp: , Rfl:      Fexofenadine HCl (ALLEGRA ALLERGY OR), 180 mg.  , Disp: , Rfl:     diphenhydrAMINE HCl (BENADRYL ALLERGY OR), , Disp: , Rfl:     Bacillus Coagulans-Inulin (PROBIOTIC) 1-250 BILLION-MG Oral Cap, Take by mouth., Disp: , Rfl:     ALLERGIES:    Allergies   Allergen Reactions    Codeine ANAPHYLAXIS    Seasonal OTHER (SEE COMMENTS)     Itchy, water eyes, coughing, sneezing, sometimes skin rashes         Review of Systems:  Constitutional:  Denies fatigue, night sweats, hot flashes  Eyes:  denies blurred or double vision  Cardiovascular:  denies chest pain or palpitations  Respiratory:  denies shortness of breath  Gastrointestinal:  denies heartburn, abdominal pain, diarrhea or constipation  Genitourinary:  denies dysuria, incontinence, abnormal vaginal discharge, vaginal itching,   Musculoskeletal:  denies back pain   Skin/Breast:  Denies any breast pain, lumps, or discharge.   Neurological:  denies headaches, extremity weakness or numbness.  Psychiatric: denies depression or anxiety.  Endocrine:   denies excessive thirst or urination.  Heme/Lymph:  denies history of anemia, easy bruising or bleeding.      PHYSICAL EXAM:     Vitals:    02/14/24 1107   BP: 137/87   Pulse: 87   Weight: 197 lb (89.4 kg)       Body mass index is 32.04 kg/m².     Patient offered chaperone, patient declined.     Constitutional: well developed, well nourished  Psychiatric:  Oriented to time, place, person and situation. Appropriate mood and affect  Head/Face: normocephalic  Neck/Thyroid: thyroid symmetric, no thyromegaly, no nodules, no adenopathy  Lymphatic:no abnormal supraclavicular or axillary adenopathy is noted  Breast: normal without palpable masses, tenderness, asymmetry, nipple discharge, nipple retraction or skin changes  Abdomen:  soft, nontender, nondistended, no masses  Skin/Hair: no unusual rashes or bruises  Extremities: no edema, no cyanosis    Pelvic Exam:  External Genitalia: normal appearance, hair distribution, and no  lesions  Urethral Meatus:  normal in size, location, without lesions and prolapse  Bladder:  No fullness, masses or tenderness  Vagina:  Normal appearance without lesions, no abnormal discharge  Cervix:  +IUD strings, Normal without tenderness on motion  Uterus: normal in size, contour, position, mobility, without tenderness  Adnexa: normal without masses or tenderness  Perineum: normal  Anus: no hemorroids     Assessment & Plan:    ICD-10-CM    1. Well woman exam with routine gynecological exam  Z01.419       2. Encounter for IUD removal  Z30.432 REMOVE INTRAUTERINE DEVICE [91768]      3. Patient desires pregnancy  Z31.9          Reviewed ASCCP guidelines with the patient   Pap due in 2025  Contraception: Using liletta - removed today - see procedure note.  Desires pregnancy- We discussed menstrual cycle, ovulation, and well-timed unprotected intercourse. We discussed the possibility of evaluation for infertility in case there has been no conception after 12 months (for women less than aged 35) or 6 months (for women greater than 35). We discussed beginning a daily prenatal or multi-vitamin containing 400 mcg of folic acid. We discussed healthy diet, exercise,    We discussed timing of initial prenatal visit - I recommended scheduling first appointment 3-4 weeks after missed menses/+pregnancy test (see below). All questions were answered.     Breast Health:     Reviewed current guidelines with the patient and to start Mammograms at age 40  Reviewed monthly self breast exams with the patient   Discussed diet, exercise, MVIs with Ca/Vit D  Follow up in 1 yr for WWROSELYN Bassett    This note was prepared using Dragon Medical voice recognition dictation software. As a result errors may occur. When identified these errors have been corrected. While every attempt is made to correct errors during dictation discrepancies may still exist.

## 2024-04-18 ENCOUNTER — PATIENT MESSAGE (OUTPATIENT)
Dept: INTERNAL MEDICINE CLINIC | Facility: CLINIC | Age: 34
End: 2024-04-18

## 2024-04-18 NOTE — TELEPHONE ENCOUNTER
From: Estefany Villalba  To: Celeste Mayes  Sent: 4/18/2024 2:31 PM CDT  Subject: ALPRAZolam 0.25 MG Tabs    Hi     Would you kindly help me refill my ALPRAZolam 0.25 MG Tabs    Thank you

## 2024-05-01 ENCOUNTER — HOSPITAL ENCOUNTER (OUTPATIENT)
Age: 34
Discharge: HOME | End: 2024-05-01
Payer: COMMERCIAL

## 2024-05-01 VITALS
RESPIRATION RATE: 20 BRPM | OXYGEN SATURATION: 97 % | SYSTOLIC BLOOD PRESSURE: 130 MMHG | DIASTOLIC BLOOD PRESSURE: 88 MMHG | TEMPERATURE: 99 F | HEART RATE: 92 BPM

## 2024-05-01 DIAGNOSIS — J11.1 INFLUENZA-LIKE ILLNESS: Primary | ICD-10-CM

## 2024-05-01 LAB — S PYO AG THROAT QL: NEGATIVE

## 2024-05-01 PROCEDURE — 87880 STREP A ASSAY W/OPTIC: CPT | Performed by: PHYSICIAN ASSISTANT

## 2024-05-01 PROCEDURE — 99213 OFFICE O/P EST LOW 20 MIN: CPT | Performed by: PHYSICIAN ASSISTANT

## 2024-05-01 NOTE — ED INITIAL ASSESSMENT (HPI)
Pt c/o sore throat for the past 72 hours. Low grade fevers, chills, nausea, headache. Able to tolerate fluids. Concerned for strep

## 2024-05-01 NOTE — ED PROVIDER NOTES
Patient Seen in: [facility]      History     Chief Complaint   Patient presents with    Sore Throat     Might be strep - Entered by patient     Stated Complaint: Sore Throat - Might be strep    Subjective:   HPI    34-year-old female presenting for evaluation of sore throat for the last 3 days.  Associated fevers, chills, nausea and headache.  She is tolerating p.o.  She would like to have a strep test.    Objective:   Past Medical History:    Anxiety    Appendicitis    Arm fracture    Chlamydia    History of nasal septoplasty    Sexually transmitted disease    trich              Past Surgical History:   Procedure Laterality Date    Appendectomy  2001    Appendectomy      Insert intrauterine device      Other surgical history  2008    Turbinate reduction/SMR    Remove intrauterine device      Tonsillectomy  2008    per NG - T&A                Social History     Socioeconomic History    Marital status:    Tobacco Use    Smoking status: Never     Passive exposure: Never    Smokeless tobacco: Never   Vaping Use    Vaping status: Never Used   Substance and Sexual Activity    Alcohol use: Not Currently     Alcohol/week: 0.0 standard drinks of alcohol     Comment: Social.     Drug use: Never    Sexual activity: Yes     Partners: Male     Birth control/protection: Paragard     Comment: 10/2013              Review of Systems    Positive for stated complaint: Sore Throat - Might be strep  Other systems are as noted in HPI.  Constitutional and vital signs reviewed.      All other systems reviewed and negative except as noted above.    Physical Exam     ED Triage Vitals [05/01/24 1056]   /88   Pulse 92   Resp 20   Temp 98.6 °F (37 °C)   Temp src Oral   SpO2 97 %   O2 Device None (Room air)       Current:/88   Pulse 92   Temp 98.6 °F (37 °C) (Oral)   Resp 20   SpO2 97%         Physical Exam  Vitals and nursing note reviewed.   Constitutional:       General: She is not in acute  distress.  HENT:      Head: Normocephalic and atraumatic.      Right Ear: External ear normal.      Left Ear: External ear normal.      Nose: Nose normal.      Mouth/Throat:      Mouth: Mucous membranes are moist.      Pharynx: Uvula midline.      Tonsils: No tonsillar exudate.   Eyes:      Extraocular Movements: Extraocular movements intact.      Pupils: Pupils are equal, round, and reactive to light.   Cardiovascular:      Rate and Rhythm: Normal rate.   Pulmonary:      Effort: Pulmonary effort is normal.      Breath sounds: No wheezing.   Abdominal:      General: Abdomen is flat.   Musculoskeletal:         General: Normal range of motion.      Cervical back: Normal range of motion.   Skin:     General: Skin is warm.   Neurological:      General: No focal deficit present.      Mental Status: She is alert and oriented to person, place, and time.   Psychiatric:         Mood and Affect: Mood normal.         Behavior: Behavior normal.               ED Course     Labs Reviewed   POCT RAPID STREP - Normal          34-year-old female presenting for evaluation of sore throat, body aches and HA and chills.  She is afebrile in IC and well appearing.   Ddx- strep pharyngitis, influenza, covid      Strep negative.  suspect viral etiology.  Supportive care, anticipatory guidance         MDM                                         Medical Decision Making      Disposition and Plan     Clinical Impression:  1. Influenza-like illness         Disposition:  Discharge  5/1/2024 11:24 am    Follow-up:  Celeste Mayes MD  [address] [place] [id number] [phone]                Medications Prescribed:  Discharge Medication List as of 5/1/2024 11:29 AM

## 2024-10-16 ENCOUNTER — OFFICE VISIT (OUTPATIENT)
Dept: INTERNAL MEDICINE CLINIC | Facility: CLINIC | Age: 34
End: 2024-10-16
Payer: COMMERCIAL

## 2024-10-16 VITALS
HEART RATE: 85 BPM | BODY MASS INDEX: 30.9 KG/M2 | SYSTOLIC BLOOD PRESSURE: 118 MMHG | DIASTOLIC BLOOD PRESSURE: 85 MMHG | WEIGHT: 190 LBS | HEIGHT: 65.75 IN

## 2024-10-16 DIAGNOSIS — Z00.00 PHYSICAL EXAM: Primary | ICD-10-CM

## 2024-10-16 DIAGNOSIS — Z12.4 SCREENING FOR CERVICAL CANCER: ICD-10-CM

## 2024-10-16 NOTE — PROGRESS NOTES
Subjective:     Patient ID: Estefany Villalba is a 34 year old female.    HPI    History/Other:   Review of Systems  Current Outpatient Medications   Medication Sig Dispense Refill    TIRZEPATIDE-WEIGHT MANAGEMENT SC Inject 10 mg into the skin.      ALPRAZolam (XANAX) 0.25 MG Oral Tab Take 1 tablet (0.25 mg total) by mouth 2 (two) times daily as needed for Anxiety. 60 tablet 0    Fexofenadine HCl (ALLEGRA ALLERGY OR) 180 mg.        diphenhydrAMINE HCl (BENADRYL ALLERGY OR)       Bacillus Coagulans-Inulin (PROBIOTIC) 1-250 BILLION-MG Oral Cap Take by mouth.       Allergies:Allergies[1]    Past Medical History:    Anxiety    Appendicitis    Arm fracture    Chlamydia    History of nasal septoplasty    Sexually transmitted disease    trich      Past Surgical History:   Procedure Laterality Date    Appendectomy  2001    Appendectomy      Insert intrauterine device      Other surgical history  2008    Turbinate reduction/SMR    Remove intrauterine device      Tonsillectomy  2008    per NG - T&A      Family History   Problem Relation Age of Onset    Diabetes Maternal Grandfather     Heart Disease Paternal Grandfather         Coronary artery disease     Thyroid Disorder Mother     Hypertension Mother     Thyroid Disorder Maternal Grandmother     Thyroid Disorder Maternal Aunt     Heart Disorder Father       Social History:   Social History     Socioeconomic History    Marital status:    Tobacco Use    Smoking status: Never     Passive exposure: Never    Smokeless tobacco: Never   Vaping Use    Vaping status: Never Used   Substance and Sexual Activity    Alcohol use: Not Currently     Alcohol/week: 0.0 standard drinks of alcohol     Comment: Social.     Drug use: Never    Sexual activity: Yes     Partners: Male     Birth control/protection: Paragard     Comment: 10/2013        Objective:   Physical Exam    Assessment & Plan:   1. Physical exam        Orders Placed This Encounter   Procedures    CBC With Differential With  Platelet    Comp Metabolic Panel (14)    Lipid Panel    Hemoglobin A1C    TSH and Free T4    Urinalysis, Routine       Meds This Visit:  Requested Prescriptions      No prescriptions requested or ordered in this encounter       Imaging & Referrals:  None            [1]   Allergies  Allergen Reactions    Codeine ANAPHYLAXIS    Seasonal OTHER (SEE COMMENTS)     Itchy, water eyes, coughing, sneezing, sometimes skin rashes

## 2024-10-21 ENCOUNTER — TELEPHONE (OUTPATIENT)
Dept: INTERNAL MEDICINE CLINIC | Facility: CLINIC | Age: 34
End: 2024-10-21

## 2024-10-21 ENCOUNTER — LAB ENCOUNTER (OUTPATIENT)
Dept: LAB | Age: 34
End: 2024-10-21
Attending: INTERNAL MEDICINE
Payer: COMMERCIAL

## 2024-10-21 DIAGNOSIS — Z00.00 PHYSICAL EXAM: ICD-10-CM

## 2024-10-21 LAB
ALBUMIN SERPL-MCNC: 4.9 G/DL (ref 3.2–4.8)
ALBUMIN/GLOB SERPL: 2.1 {RATIO} (ref 1–2)
ALP LIVER SERPL-CCNC: 79 U/L
ALT SERPL-CCNC: 21 U/L
ANION GAP SERPL CALC-SCNC: 7 MMOL/L (ref 0–18)
AST SERPL-CCNC: 16 U/L (ref ?–34)
BASOPHILS # BLD AUTO: 0.03 X10(3) UL (ref 0–0.2)
BASOPHILS NFR BLD AUTO: 0.5 %
BILIRUB SERPL-MCNC: 0.9 MG/DL (ref 0.3–1.2)
BILIRUB UR QL: NEGATIVE
BUN BLD-MCNC: 8 MG/DL (ref 9–23)
BUN/CREAT SERPL: 9.3 (ref 10–20)
CALCIUM BLD-MCNC: 9.7 MG/DL (ref 8.7–10.4)
CHLORIDE SERPL-SCNC: 110 MMOL/L (ref 98–112)
CHOLEST SERPL-MCNC: 199 MG/DL (ref ?–200)
CLARITY UR: CLEAR
CO2 SERPL-SCNC: 22 MMOL/L (ref 21–32)
CREAT BLD-MCNC: 0.86 MG/DL
DEPRECATED RDW RBC AUTO: 40.9 FL (ref 35.1–46.3)
EGFRCR SERPLBLD CKD-EPI 2021: 92 ML/MIN/1.73M2 (ref 60–?)
EOSINOPHIL # BLD AUTO: 0.21 X10(3) UL (ref 0–0.7)
EOSINOPHIL NFR BLD AUTO: 3.5 %
ERYTHROCYTE [DISTWIDTH] IN BLOOD BY AUTOMATED COUNT: 12 % (ref 11–15)
EST. AVERAGE GLUCOSE BLD GHB EST-MCNC: 94 MG/DL (ref 68–126)
FASTING PATIENT LIPID ANSWER: YES
FASTING STATUS PATIENT QL REPORTED: YES
GLOBULIN PLAS-MCNC: 2.3 G/DL (ref 2–3.5)
GLUCOSE BLD-MCNC: 95 MG/DL (ref 70–99)
GLUCOSE UR-MCNC: NORMAL MG/DL
HBA1C MFR BLD: 4.9 % (ref ?–5.7)
HCT VFR BLD AUTO: 41.5 %
HDLC SERPL-MCNC: 42 MG/DL (ref 40–59)
HGB BLD-MCNC: 14.6 G/DL
HGB UR QL STRIP.AUTO: NEGATIVE
IMM GRANULOCYTES # BLD AUTO: 0.01 X10(3) UL (ref 0–1)
IMM GRANULOCYTES NFR BLD: 0.2 %
KETONES UR-MCNC: NEGATIVE MG/DL
LDLC SERPL CALC-MCNC: 129 MG/DL (ref ?–100)
LEUKOCYTE ESTERASE UR QL STRIP.AUTO: NEGATIVE
LYMPHOCYTES # BLD AUTO: 1.89 X10(3) UL (ref 1–4)
LYMPHOCYTES NFR BLD AUTO: 31.2 %
MCH RBC QN AUTO: 32.5 PG (ref 26–34)
MCHC RBC AUTO-ENTMCNC: 35.2 G/DL (ref 31–37)
MCV RBC AUTO: 92.4 FL
MONOCYTES # BLD AUTO: 0.48 X10(3) UL (ref 0.1–1)
MONOCYTES NFR BLD AUTO: 7.9 %
NEUTROPHILS # BLD AUTO: 3.43 X10 (3) UL (ref 1.5–7.7)
NEUTROPHILS # BLD AUTO: 3.43 X10(3) UL (ref 1.5–7.7)
NEUTROPHILS NFR BLD AUTO: 56.7 %
NITRITE UR QL STRIP.AUTO: NEGATIVE
NONHDLC SERPL-MCNC: 157 MG/DL (ref ?–130)
OSMOLALITY SERPL CALC.SUM OF ELEC: 286 MOSM/KG (ref 275–295)
PH UR: 6 [PH] (ref 5–8)
PLATELET # BLD AUTO: 294 10(3)UL (ref 150–450)
POTASSIUM SERPL-SCNC: 3.9 MMOL/L (ref 3.5–5.1)
PROT SERPL-MCNC: 7.2 G/DL (ref 5.7–8.2)
PROT UR-MCNC: NEGATIVE MG/DL
RBC # BLD AUTO: 4.49 X10(6)UL
SODIUM SERPL-SCNC: 139 MMOL/L (ref 136–145)
SP GR UR STRIP: 1.01 (ref 1–1.03)
T4 FREE SERPL-MCNC: 1.3 NG/DL (ref 0.8–1.7)
TRIGL SERPL-MCNC: 156 MG/DL (ref 30–149)
TSI SER-ACNC: 0.64 MIU/ML (ref 0.55–4.78)
UROBILINOGEN UR STRIP-ACNC: NORMAL
VLDLC SERPL CALC-MCNC: 28 MG/DL (ref 0–30)
WBC # BLD AUTO: 6.1 X10(3) UL (ref 4–11)

## 2024-10-21 PROCEDURE — 84443 ASSAY THYROID STIM HORMONE: CPT

## 2024-10-21 PROCEDURE — 80061 LIPID PANEL: CPT

## 2024-10-21 PROCEDURE — 85025 COMPLETE CBC W/AUTO DIFF WBC: CPT

## 2024-10-21 PROCEDURE — 80053 COMPREHEN METABOLIC PANEL: CPT

## 2024-10-21 PROCEDURE — 84439 ASSAY OF FREE THYROXINE: CPT

## 2024-10-21 PROCEDURE — 81003 URINALYSIS AUTO W/O SCOPE: CPT

## 2024-10-21 PROCEDURE — 36415 COLL VENOUS BLD VENIPUNCTURE: CPT

## 2024-10-21 PROCEDURE — 83036 HEMOGLOBIN GLYCOSYLATED A1C: CPT

## 2024-10-21 NOTE — TELEPHONE ENCOUNTER
Chart notes are still open.  Please clarify diagnosis, does the patient have type 2 diabetes?     If not, this medication will not be covered with a prior auth without type 2 diabetes.

## 2024-10-21 NOTE — TELEPHONE ENCOUNTER
Prior Authorization      Tirzepatide (MOUNJARO) 15 MG/0.5ML Subcutaneous Solution Pen-injector, Inject 15 mg into the skin once a week for 4 doses., Disp: 4 mL, Rfl: 3      BLEAUCBB

## 2024-10-22 NOTE — TELEPHONE ENCOUNTER
Pt is not diabetic  She was getting mounjaro from an outside facility  prescribed for her  She wanted to know if her insurance will cover    Tell her not covered

## 2024-10-28 NOTE — PROGRESS NOTES
HPI:    Patient ID: Estefany Villalba is a 34 year old female.    HPI    Physical exam  Generally healthy    /85 (BP Location: Right arm, Patient Position: Sitting, Cuff Size: adult)   Pulse 85   Ht 5' 5.75\" (1.67 m)   Wt 190 lb (86.2 kg)   LMP 10/14/2024 (Exact Date)   BMI 30.90 kg/m²   Wt Readings from Last 6 Encounters:   10/16/24 190 lb (86.2 kg)   02/14/24 197 lb (89.4 kg)   10/04/23 190 lb (86.2 kg)   08/21/23 196 lb 9.6 oz (89.2 kg)   09/07/22 195 lb (88.5 kg)   09/14/21 185 lb (83.9 kg)     Body mass index is 30.9 kg/m².  HGBA1C:    Lab Results   Component Value Date    A1C 4.9 10/21/2024    A1C 5.5 07/27/2023    EAG 94 10/21/2024         Review of Systems   Constitutional:  Negative for activity change, chills, fatigue and fever.   HENT:  Negative for ear discharge, nosebleeds, postnasal drip, rhinorrhea, sinus pressure and sore throat.    Eyes:  Negative for pain, discharge and redness.   Respiratory:  Negative for cough, chest tightness, shortness of breath and wheezing.    Cardiovascular:  Negative for chest pain, palpitations and leg swelling.   Gastrointestinal:  Negative for abdominal pain, blood in stool, constipation, diarrhea, nausea and vomiting.   Genitourinary:  Negative for difficulty urinating, dysuria, frequency, hematuria and urgency.   Musculoskeletal:  Negative for back pain, gait problem and joint swelling.   Skin:  Negative for rash.   Neurological:  Negative for syncope, weakness, light-headedness and headaches.   Psychiatric/Behavioral:  Negative for dysphoric mood. The patient is not nervous/anxious.          Current Outpatient Medications   Medication Sig Dispense Refill    Tirzepatide (MOUNJARO) 15 MG/0.5ML Subcutaneous Solution Pen-injector Inject 15 mg into the skin once a week for 4 doses. 4 mL 3    ALPRAZolam (XANAX) 0.25 MG Oral Tab Take 1 tablet (0.25 mg total) by mouth 2 (two) times daily as needed for Anxiety. 60 tablet 0    Fexofenadine HCl (ALLEGRA ALLERGY OR) 180  mg.        diphenhydrAMINE HCl (BENADRYL ALLERGY OR)       Bacillus Coagulans-Inulin (PROBIOTIC) 1-250 BILLION-MG Oral Cap Take by mouth.       Allergies:Allergies[1]    HISTORY:  Past Medical History:    Anxiety    Appendicitis    Arm fracture    Chlamydia    History of nasal septoplasty    Sexually transmitted disease    trich      Past Surgical History:   Procedure Laterality Date    Appendectomy  2001    Appendectomy      Insert intrauterine device      Other surgical history  2008    Turbinate reduction/SMR    Remove intrauterine device      Tonsillectomy  2008    per NG - T&A      Family History   Problem Relation Age of Onset    Diabetes Maternal Grandfather     Heart Disease Paternal Grandfather         Coronary artery disease     Thyroid Disorder Mother     Hypertension Mother     Thyroid Disorder Maternal Grandmother     Thyroid Disorder Maternal Aunt     Heart Disorder Father       Social History:   Social History     Socioeconomic History    Marital status:    Tobacco Use    Smoking status: Never     Passive exposure: Never    Smokeless tobacco: Never   Vaping Use    Vaping status: Never Used   Substance and Sexual Activity    Alcohol use: Not Currently     Alcohol/week: 0.0 standard drinks of alcohol     Comment: Social.     Drug use: Never    Sexual activity: Yes     Partners: Male     Birth control/protection: Paragard     Comment: 10/2013        PHYSICAL EXAM:    Physical Exam  Constitutional:       General: She is not in acute distress.     Appearance: She is well-developed. She is not diaphoretic.   HENT:      Head: Normocephalic and atraumatic.      Right Ear: Ear canal normal.      Left Ear: Ear canal normal.      Nose: Nose normal.      Mouth/Throat:      Pharynx: No oropharyngeal exudate or posterior oropharyngeal erythema.   Eyes:      General: No scleral icterus.        Right eye: No discharge.         Left eye: No discharge.      Extraocular Movements: Extraocular movements intact.       Conjunctiva/sclera: Conjunctivae normal.   Cardiovascular:      Rate and Rhythm: Normal rate and regular rhythm.      Heart sounds: Normal heart sounds. No murmur heard.  Pulmonary:      Effort: Pulmonary effort is normal. No respiratory distress.      Breath sounds: Normal breath sounds. No wheezing.   Abdominal:      General: Bowel sounds are normal.      Palpations: Abdomen is soft. There is no mass.      Tenderness: There is no abdominal tenderness. There is no guarding or rebound.      Hernia: No hernia is present.   Musculoskeletal:         General: No tenderness.   Skin:     General: Skin is warm and dry.      Findings: No lesion or rash.   Neurological:      Mental Status: She is alert.      Gait: Gait normal.   Psychiatric:         Behavior: Behavior normal.         Thought Content: Thought content normal.              ASSESSMENT/PLAN:   (Z00.00) Physical exam  (primary encounter diagnosis)  Plan: CBC With Differential With Platelet, Comp         Metabolic Panel (14), Lipid Panel, Hemoglobin         A1C, TSH and Free T4, Urinalysis, Routine    Generally healthy\  /85 (BP Location: Right arm, Patient Position: Sitting, Cuff Size: adult)   Pulse 85   Ht 5' 5.75\" (1.67 m)   Wt 190 lb (86.2 kg)   LMP 10/14/2024 (Exact Date)   BMI 30.90 kg/m²   Weight loss advised   limit overall caloric intake  Low diet  limit carbohydrate intake   increase activity  as tolerated  exercise      (Z12.4) Screening for cervical cancer  Plan: [facility] - INTERNAL      Follow up with gyne for pap smear    Patient voiced understanding  and agrees with plan         Orders Placed This Encounter   Procedures    CBC With Differential With Platelet    Comp Metabolic Panel (14)    Lipid Panel    Hemoglobin A1C    TSH and Free T4    Urinalysis, Routine       Meds This Visit:  Requested Prescriptions     Signed Prescriptions Disp Refills    Tirzepatide (MOUNJARO) 15 MG/0.5ML Subcutaneous Solution Pen-injector 4 mL 3     Sig: Inject 15  mg into the skin once a week for 4 doses.       Imaging & Referrals:  [facility] - INTERNAL        ID#[id number]           [1]   Allergies  Allergen Reactions    Codeine ANAPHYLAXIS    Seasonal OTHER (SEE COMMENTS)     Itchy, water eyes, coughing, sneezing, sometimes skin rashes

## 2025-01-04 ENCOUNTER — HOSPITAL ENCOUNTER (OUTPATIENT)
Age: 35
Discharge: HOME | End: 2025-01-04
Payer: COMMERCIAL

## 2025-01-04 VITALS
DIASTOLIC BLOOD PRESSURE: 88 MMHG | OXYGEN SATURATION: 97 % | HEART RATE: 82 BPM | RESPIRATION RATE: 22 BRPM | SYSTOLIC BLOOD PRESSURE: 143 MMHG | TEMPERATURE: 98 F

## 2025-01-04 DIAGNOSIS — N30.90 CYSTITIS: Primary | ICD-10-CM

## 2025-01-04 LAB
B-HCG UR QL: NEGATIVE
BILIRUB UR QL STRIP: NEGATIVE
CLARITY UR: CLEAR
COLOR UR: YELLOW
GLUCOSE UR STRIP-MCNC: NEGATIVE MG/DL
KETONES UR STRIP-MCNC: NEGATIVE MG/DL
NITRITE UR QL STRIP: NEGATIVE
PH UR STRIP: 6 [PH]
SP GR UR STRIP: >=1.03
UROBILINOGEN UR STRIP-ACNC: <2 MG/DL

## 2025-01-04 PROCEDURE — 87086 URINE CULTURE/COLONY COUNT: CPT | Performed by: PHYSICIAN ASSISTANT

## 2025-01-04 RX ORDER — SULFAMETHOXAZOLE AND TRIMETHOPRIM 800; 160 MG/1; MG/1
1 TABLET ORAL 2 TIMES DAILY
Qty: 14 TABLET | Refills: 0 | Status: SHIPPED | OUTPATIENT
Start: 2025-01-04 | End: 2025-01-11

## 2025-01-04 RX ORDER — PHENAZOPYRIDINE HYDROCHLORIDE 200 MG/1
200 TABLET, FILM COATED ORAL 3 TIMES DAILY PRN
Qty: 6 TABLET | Refills: 0 | Status: SHIPPED | OUTPATIENT
Start: 2025-01-04 | End: 2025-01-06

## 2025-01-04 RX ORDER — FLUCONAZOLE 150 MG/1
150 TABLET ORAL ONCE
Qty: 1 TABLET | Refills: 0 | Status: SHIPPED | OUTPATIENT
Start: 2025-01-04 | End: 2025-01-04

## 2025-01-04 NOTE — ED PROVIDER NOTES
Chief Complaint   Patient presents with    Urinary Symptoms       HPI:     Estefany Villalba is a 34 year old female who presents for evaluation of mid pelvic cramping and dysuria polyuria onset yesterday morning.  Notes previous history UTI years ago without recent treatment or antibiotic.  Denies any OTC medications onset.  Notes slight nausea without vomiting.  Denies any antipruritics, afebrile on arrival.  Patient is monogamous, denies any STI concerns or pregnancy concerns with onset of menses starting in the last few days.  Denies associated chills headache dizziness neck pain chest pain shortness of breath abdominal pain flank pain hematuria vaginal  discharge upper lower extremity weakness numbness or swelling.      PFSH    PFSH asessment screens reviewed and agree.  Nurses notes reviewed I agree with documentation.    Family History   Problem Relation Age of Onset    Diabetes Maternal Grandfather     Heart Disease Paternal Grandfather         Coronary artery disease     Thyroid Disorder Mother     Hypertension Mother     Thyroid Disorder Maternal Grandmother     Thyroid Disorder Maternal Aunt     Heart Disorder Father      Family history reviewed with patient/caregiver and is not pertinent to presenting problem.  Social History     Socioeconomic History    Marital status:      Spouse name: Not on file    Number of children: Not on file    Years of education: Not on file    Highest education level: Not on file   Occupational History    Not on file   Tobacco Use    Smoking status: Never     Passive exposure: Never    Smokeless tobacco: Never   Vaping Use    Vaping status: Never Used   Substance and Sexual Activity    Alcohol use: Not Currently     Alcohol/week: 0.0 standard drinks of alcohol     Comment: Social.     Drug use: Never    Sexual activity: Yes     Partners: Male     Birth control/protection: Paragard     Comment: 10/2013   Other Topics Concern    Not on file   Social History Narrative    Not  on file     Social Drivers of Health     Financial Resource Strain: Not on file   Food Insecurity: Not on file   Transportation Needs: Not on file   Physical Activity: Not on file   Stress: Not on file   Social Connections: Not on file   Housing Stability: Not on file         ROS:   Positive for stated complaint: Dysuria pelvic cramping.  All other systems reviewed and negative except as noted above.  Constitutional and Vital Signs Reviewed.      Physical Exam:     Findings:    /88   Pulse 82   Temp 98.4 °F (36.9 °C) (Oral)   Resp 22   LMP 01/04/2025 (Exact Date)   SpO2 97%   GENERAL: well developed, well nourished, well hydrated, no distress  SKIN: good skin turgor, no obvious rashes  EXTREMITIES: no cyanosis or edema. LATHAM without difficulty  GI: No adnexal or CVA tenderness, negative Ortiz.  Negative McBurney.  Negative rebound.  Normal bowel sounds  HEAD: normocephalic, atraumatic  EYES: sclera non icteric bilateral, conjunctiva clear  NEURO: No focal deficits  PSYCH: Alert and oriented x3.  Answering questions appropriately.  Mood appropriate.    MDM/Assessment/Plan:   Orders for this encounter:    Orders Placed This Encounter    POCT Urinalysis Dipstick    POCT Pregnancy, Urine    Urine Culture, Routine     Order Specific Question:   Specimen source:     Answer:   Urine, clean catch     Order Specific Question:   Documentation of symptoms of UTI or sepsis:     Answer:   Documentation of symptoms of UTI or sepsis must be corroborated within the EMR     Order Specific Question:   Release to patient     Answer:   Immediate    POCT Urine Dip    POCT Pregnancy, Urine    sulfamethoxazole-trimethoprim -160 MG Oral Tab per tablet     Sig: Take 1 tablet by mouth 2 (two) times daily for 7 days.     Dispense:  14 tablet     Refill:  0    phenazopyridine 200 MG Oral Tab     Sig: Take 1 tablet (200 mg total) by mouth 3 (three) times daily as needed for Pain.     Dispense:  6 tablet     Refill:  0     fluconazole (DIFLUCAN) 150 MG Oral Tab     Sig: Take 1 tablet (150 mg total) by mouth once for 1 dose.     Dispense:  1 tablet     Refill:  0       Labs performed this visit:  Recent Results (from the past 10 hours)   POCT Urinalysis Dipstick    Collection Time: 01/04/25  8:16 AM   Result Value Ref Range    Urine Color Yellow Yellow    Urine Clarity Clear Clear    Specific Gravity, Urine >=1.030 1.005 - 1.030    PH, Urine 6.0 5.0 - 8.0    Protein urine Trace (A) Negative mg/dL    Glucose, Urine Negative Negative mg/dL    Ketone, Urine Negative Negative mg/dL    Bilirubin, Urine Negative Negative    Blood, Urine Moderate (A) Negative    Nitrite Urine Negative Negative    Urobilinogen urine <2.0 <2.0 mg/dL    Leukocyte esterase urine Small (A) Negative   POCT Pregnancy, Urine    Collection Time: 01/04/25  8:21 AM   Result Value Ref Range    POCT Urine Pregnancy Negative Negative       MDM:  Urine shows small leukocytes with microscopic hematuria.  UCG negative, culture was sent, previous cultures without growth.  Patient agrees empiric coverage pending outpatient follow-up and culture results as well as instructed on changes warranting outpatient versus emergent reevaluation, happy with plan of care alert nontoxic.  Pyridium provided with instruction on pigment change notified prior to discharge.    Diagnosis:    ICD-10-CM    1. Cystitis  N30.90           All results reviewed and discussed with patient.  See AVS for detailed discharge instructions for your condition today.    Follow Up with:  Rodo Fong APRN  [address] [place] [address]  [phone]    Schedule an appointment as soon as possible for a visit in 3 days  PRIMARY REFERRAL; READDRESS CULTURE BY INSTRUCTION. GO TO ER FOR BREAKTHROUGH CHANGES

## 2025-03-19 ENCOUNTER — OFFICE VISIT (OUTPATIENT)
Dept: OBGYN CLINIC | Facility: CLINIC | Age: 35
End: 2025-03-19
Payer: COMMERCIAL

## 2025-03-19 VITALS
WEIGHT: 198.81 LBS | HEART RATE: 83 BPM | DIASTOLIC BLOOD PRESSURE: 86 MMHG | SYSTOLIC BLOOD PRESSURE: 127 MMHG | BODY MASS INDEX: 32.34 KG/M2 | HEIGHT: 65.75 IN

## 2025-03-19 DIAGNOSIS — Z01.419 WELL WOMAN EXAM WITH ROUTINE GYNECOLOGICAL EXAM: Primary | ICD-10-CM

## 2025-03-19 DIAGNOSIS — Z12.4 SCREENING FOR CERVICAL CANCER: ICD-10-CM

## 2025-03-19 DIAGNOSIS — K59.00 CONSTIPATION, UNSPECIFIED CONSTIPATION TYPE: ICD-10-CM

## 2025-03-19 DIAGNOSIS — R14.0 BLOATING SYMPTOM: ICD-10-CM

## 2025-03-19 PROCEDURE — 3074F SYST BP LT 130 MM HG: CPT | Performed by: NURSE PRACTITIONER

## 2025-03-19 PROCEDURE — 3008F BODY MASS INDEX DOCD: CPT | Performed by: NURSE PRACTITIONER

## 2025-03-19 PROCEDURE — 3079F DIAST BP 80-89 MM HG: CPT | Performed by: NURSE PRACTITIONER

## 2025-03-19 PROCEDURE — 99395 PREV VISIT EST AGE 18-39: CPT | Performed by: NURSE PRACTITIONER

## 2025-03-19 NOTE — PROGRESS NOTES
[facility]    Obstetrics and Gynecology    Chief Complaint   Patient presents with    Gyn Exam     ANNUAL EXAM       Estefany Villalba is a 35 year old female  Patient's last menstrual period was 2025 (exact date). presenting for annual gynecology exam.  Last seen 2024, liletta IUD removed and had annual visit.  She reports periods coming every month, lasting3-5 days, normal flow, minor cramping and bloating.  She reports increased bloating and constipation all the time. She has done pelvic floor exercises, working out consistently.  BM daily but small georgette- started when on monjauro- took for about 3 months, lost weight but has regained. Still constipated when off monjauro.  Fiber pill every day and miralax prn. Drinking a lot of water.    She is sexually active with her . Just  in . No pelvic pain. No concerns with intercourse.   Exercising regularly  She is weight training 3-5 times a week.    Patient concerned about pregnancy weight changes. Encouraged therapy. Desires pregnancy. Hasn't been trying but not avoiding  Some stress urinary incontinence.     Pap: 2022 NILM, neg human papillomavirus; due today  2020 NILM, neg HPV  (care everywhere)  2018 DIXON; colpo 2018- CHAO 1  Contraception:liletta IUD  Mammo: n/a    OBSTETRICS HISTORY:  OB History    Para Term  AB Living   0 0 0 0 0 0   SAB IAB Ectopic Multiple Live Births   0 0 0 0 0       GYNE HISTORY:  Menarche: 15 (3/19/2025  8:11 AM)  Period Cycle (Days): MONTHLY (3/19/2025  8:11 AM)  Period Duration (Days): 4-5 (3/19/2025  8:11 AM)  Period Flow: VARIES (3/19/2025  8:11 AM)  Use of Birth Control (if yes, specify type): None (3/19/2025  8:11 AM)  Pap Date: 22 (3/19/2025  8:11 AM)  Pap Result Notes: NEG PAP / NEG HPV (3/19/2025  8:11 AM)      History   Sexual Activity    Sexual activity: Yes    Partners: Male     Comment: NONE           Latest Ref Rng & Units 2022     9:16 AM   RECENT PAP  RESULTS   INTERPRETATION/RESULT: Negative for intraepithelial lesion or malignancy Negative for intraepithelial lesion or malignancy    HPV Negative Negative          MEDICAL HISTORY:  Past Medical History:    Anxiety    Appendicitis    Arm fracture    Chlamydia    History of nasal septoplasty    Sexually transmitted disease    trich     Past Surgical History:   Procedure Laterality Date    Appendectomy  2001    Appendectomy      Insert intrauterine device      Other surgical history  2008    Turbinate reduction/SMR    Remove intrauterine device      Tonsillectomy  2008    per NG - T&A       SOCIAL HISTORY:  Social History     Socioeconomic History    Marital status:      Spouse name: Not on file    Number of children: Not on file    Years of education: Not on file    Highest education level: Not on file   Occupational History    Not on file   Tobacco Use    Smoking status: Never     Passive exposure: Never    Smokeless tobacco: Never   Vaping Use    Vaping status: Never Used   Substance and Sexual Activity    Alcohol use: Yes     Comment: Social.     Drug use: Never    Sexual activity: Yes     Partners: Male     Comment: NONE   Other Topics Concern    Not on file   Social History Narrative    Not on file     Social Drivers of Health     Food Insecurity: No Food Insecurity (3/19/2025)    NCSS - Food Insecurity     Worried About Running Out of Food in the Last Year: No     Ran Out of Food in the Last Year: No   Transportation Needs: No Transportation Needs (3/19/2025)    NCSS - Transportation     Lack of Transportation: No   Stress: Not on file   Housing Stability: Not At Risk (3/19/2025)    NCSS - Housing/Utilities     Has Housing: Yes     Worried About Losing Housing: No     Unable to Get Utilities: No         Depression Screening (PHQ-2/PHQ-9): Over the LAST 2 WEEKS   Little interest or pleasure in doing things: Not at all    Feeling down, depressed, or hopeless: Not at all    PHQ-2 SCORE: 0            FAMILY HISTORY:  Family History   Problem Relation Age of Onset    Diabetes Maternal Grandfather     Heart Disease Paternal Grandfather         Coronary artery disease     Thyroid Disorder Mother     Hypertension Mother     Thyroid Disorder Maternal Grandmother     Thyroid Disorder Maternal Aunt     Heart Disorder Father        MEDICATIONS:    Current Outpatient Medications:     ALPRAZolam (XANAX) 0.25 MG Oral Tab, Take 1 tablet (0.25 mg total) by mouth 2 (two) times daily as needed for Anxiety., Disp: 60 tablet, Rfl: 0    Fexofenadine HCl (ALLEGRA ALLERGY OR), 180 mg.  , Disp: , Rfl:     diphenhydrAMINE HCl (BENADRYL ALLERGY OR), , Disp: , Rfl:     Bacillus Coagulans-Inulin (PROBIOTIC) 1-250 BILLION-MG Oral Cap, Take by mouth., Disp: , Rfl:     ALLERGIES:  Allergies[1]      Review of Systems:  Constitutional:  Denies fatigue, night sweats, hot flashes  Eyes:  denies blurred or double vision  Cardiovascular:  denies chest pain or palpitations  Respiratory:  denies shortness of breath  Gastrointestinal:  denies heartburn, abdominal pain, diarrhea; +constipation, + bloating  Genitourinary:  denies dysuria, incontinence, abnormal vaginal discharge, vaginal itching,   Musculoskeletal:  denies back pain   Skin/Breast:  Denies any breast pain, lumps, or discharge.   Neurological:  denies headaches, extremity weakness or numbness.  Psychiatric: denies depression or anxiety.  Endocrine:   denies excessive thirst or urination.  Heme/Lymph:  denies history of anemia, easy bruising or bleeding.      PHYSICAL EXAM:     Vitals:    03/19/25 0813   BP: 127/86   Pulse: 83   Weight: 198 lb 12.8 oz (90.2 kg)   Height: 5' 5.75\" (1.67 m)       Body mass index is 32.33 kg/m².     Patient offered chaperone, patient declined    Constitutional: well developed, well nourished  Psychiatric:  Oriented to time, place, person and situation. Appropriate mood and affect  Head/Face: normocephalic  Neck/Thyroid: thyroid symmetric, no  thyromegaly, no nodules, no adenopathy  Lymphatic:no abnormal supraclavicular or axillary adenopathy is noted  Breast: normal without palpable masses, tenderness, asymmetry, nipple discharge, nipple retraction or skin changes  Abdomen:  soft, nontender, nondistended, no masses  Skin/Hair: no unusual rashes or bruises  Extremities: no edema, no cyanosis    Pelvic Exam:  External Genitalia: normal appearance, hair distribution, and no lesions  Urethral Meatus:  normal in size, location, without lesions and prolapse  Bladder:  No fullness, masses or tenderness  Vagina:  Normal appearance without lesions, no abnormal discharge  Cervix:  Normal without tenderness on motion  Uterus: normal in size, contour, position, mobility, without tenderness  Adnexa: normal without masses or tenderness  Perineum: normal  Anus: no hemorroids     Assessment & Plan:    ICD-10-CM    1. Well woman exam with routine gynecological exam  Z01.419       2. Screening for cervical cancer  Z12.4 ThinPrep PAP Smear     Hpv Dna  High Risk , Thin Prep Collect     ThinPrep PAP Smear     Hpv Dna  High Risk , Thin Prep Collect      3. Bloating symptom  R14.0 US PELVIS W EV (CPT=76856/31305)     Gastro Referral - In Network      4. Constipation, unspecified constipation type  K59.00 US PELVIS W EV (CPT=76856/01093)     Gastro Referral - In Network         Reviewed ASCCP guidelines with the patient   Pap done today  Contraception: Using none  Constipation and bloating - pelvic ultrasound ordered, referred to GI. Recent labs with PCP in October normal  Breast Health:     Reviewed current guidelines with the patient and to start Mammograms at age 40  Reviewed monthly self breast exams with the patient   Discussed diet, exercise, MVIs with Ca/Vit D  Follow up in 1 yr for ROSELYN Gibbs    This note was prepared using Dragon Medical voice recognition dictation software. As a result errors may occur. When identified these errors have been  corrected. While every attempt is made to correct errors during dictation discrepancies may still exist.         [1]   Allergies  Allergen Reactions    Codeine ANAPHYLAXIS    Penicillins UNKNOWN    Seasonal OTHER (SEE COMMENTS)     Itchy, water eyes, coughing, sneezing, sometimes skin rashes

## 2025-03-20 LAB — HPV E6+E7 MRNA CVX QL NAA+PROBE: POSITIVE

## 2025-03-22 ENCOUNTER — PATIENT MESSAGE (OUTPATIENT)
Dept: OBGYN CLINIC | Facility: CLINIC | Age: 35
End: 2025-03-22

## 2025-03-25 LAB
HPV16 DNA CVX QL PROBE+SIG AMP: NEGATIVE
HPV18 DNA CVX QL PROBE+SIG AMP: NEGATIVE

## 2025-03-26 ENCOUNTER — OFFICE VISIT (OUTPATIENT)
Facility: CLINIC | Age: 35
End: 2025-03-26
Payer: COMMERCIAL

## 2025-03-26 VITALS
BODY MASS INDEX: 32.91 KG/M2 | HEIGHT: 65.75 IN | DIASTOLIC BLOOD PRESSURE: 96 MMHG | WEIGHT: 202.31 LBS | HEART RATE: 80 BPM | SYSTOLIC BLOOD PRESSURE: 135 MMHG

## 2025-03-26 DIAGNOSIS — K59.04 CHRONIC IDIOPATHIC CONSTIPATION: ICD-10-CM

## 2025-03-26 DIAGNOSIS — K62.5 RECTAL BLEEDING: ICD-10-CM

## 2025-03-26 DIAGNOSIS — R14.0 ABDOMINAL BLOATING: ICD-10-CM

## 2025-03-26 DIAGNOSIS — R09.89 THROAT TIGHTNESS: ICD-10-CM

## 2025-03-26 DIAGNOSIS — R32 URINARY INCONTINENCE, UNSPECIFIED TYPE: ICD-10-CM

## 2025-03-26 DIAGNOSIS — R19.4 CHANGE IN BOWEL HABITS: Primary | ICD-10-CM

## 2025-03-26 PROCEDURE — 3080F DIAST BP >= 90 MM HG: CPT | Performed by: INTERNAL MEDICINE

## 2025-03-26 PROCEDURE — 99204 OFFICE O/P NEW MOD 45 MIN: CPT | Performed by: INTERNAL MEDICINE

## 2025-03-26 PROCEDURE — 3008F BODY MASS INDEX DOCD: CPT | Performed by: INTERNAL MEDICINE

## 2025-03-26 PROCEDURE — 3075F SYST BP GE 130 - 139MM HG: CPT | Performed by: INTERNAL MEDICINE

## 2025-03-26 RX ORDER — POLYETHYLENE GLYCOL 3350 17 G/17G
17 POWDER, FOR SOLUTION ORAL DAILY
COMMUNITY

## 2025-03-26 RX ORDER — POLYETHYLENE GLYCOL 3350, SODIUM SULFATE ANHYDROUS, SODIUM BICARBONATE, SODIUM CHLORIDE, POTASSIUM CHLORIDE 236; 22.74; 6.74; 5.86; 2.97 G/4L; G/4L; G/4L; G/4L; G/4L
4 POWDER, FOR SOLUTION ORAL ONCE
Qty: 1 EACH | Refills: 0 | Status: SHIPPED | OUTPATIENT
Start: 2025-03-26 | End: 2025-03-26

## 2025-03-26 RX ORDER — SODIUM, POTASSIUM,MAG SULFATES 17.5-3.13G
SOLUTION, RECONSTITUTED, ORAL ORAL
Qty: 1 EACH | Refills: 0 | Status: SHIPPED | OUTPATIENT
Start: 2025-03-26

## 2025-03-26 NOTE — PROGRESS NOTES
** Scroll down for HPI. **    ASSESSMENT/PLAN:     35 year old fit and healthy woman, elevated BMI 32.9, extensive family history of thyroid disease who apparently has always tested normal on her thyroid function testing.    Ms. Villalba presents today to discuss change in bowel patterns, other abdominal symptoms including abdominal bloating and atypical chest, throat symptoms.    Suggest:    Change in bowel habits, recent severe refractory constipation  As per HPI below, symptom onset mid-- late 2024 after starting Mounjaro® (tirzepatide) therapy May 2024  No improvement with discontinuing the Mounjaro October 2024  Profound, unprecedented symptom; previous regular healthy bowel patterns.    Estefany has tried and failed the following laxative regimens:  Refrigerated probiotic product  Psyllium powder laxative  Stool softeners  FiberCon synthetic fiber laxative tablets  MiraLAX osmotic laxative  Stimulant pills including bisacodyl  Suppository laxatives    Routine labs including repeated thyroid assays, most recent TSH 10/21/2024 have been normal    Severe recent constipation with desiccated pellet stools has required repeated digital instrumentation, self disimpaction maneuvers  Associated abdominal bloating, gas discomfort as below    Plan:    There is an associated concern for urinary incontinence, difficulty controlling bladder function with coughing or laughter -->  Gynecology APRYUMIKO Lester has ordered pelvic ultrasound, tentatively scheduled for 4/25/2025.  This would be to rule out uterine fibroids or ovarian pathology.  Agree with that evaluation.    As she has failed all common OTC laxatives including psyllium powder as above, I recommended trial of prescription laxative.  Estefany is very uncomfortable and requests a preemptive cleanout, bowel purge which I find very reasonable given extreme nature of this constipation.    Prescription for the 4 L PEG prep (Golytely) discussed and prescribed today.  Would  begin drinking that early in the morning, drink all day tomorrow or later this week, plan on being housebound for at least 12-18 hours.    Linzess linaclotide 145 mcg laxative discussed and prescribed today.  Could step up or step down from here as per response.  Insurance may prefer Trulance less likely Motegrity.  While we are waiting for the preauthorization process, would step up the MiraLAX to 1 capful twice daily dosing for now.  Prior to the Mounjaro therapy May 2024, Estefany describes very regular satisfying bowel patterns.  It is my hope that with an effective bowel regimen we can retrain her bowel back into daily function with a finite course of therapy.  Estefany and her  may conceive a pregnancy later this year.  Today I advised discontinuing prescription laxative and all other medications prior to attempting to conceive; likely would follow back to the simpler OTC options, prunes etc    After trial of above prescription laxative, increased MiraLAX dose would next consider further workup with colonoscopy examination, consideration for cross-sectional abdominal imaging likely CT scan abdomen and pelvis  Recent urinary incontinence issue and the patient with no history of pregnancy is perplexing.  If more aggressive laxatives do not give relief, likely will need to consider pelvic floor therapy at some point.      Abdominal bloating, malodorous gas complaint     Suspect related to above change in bowel patterns  Bowel regimen, laxative therapy as above  Consideration for cross-sectional imaging as above  Consideration for trial of antibiotics in the future    Small-volume rectal bleeding     Suspect rectal irritation, hemorrhoidal type bleeding from straining, possibly from digital maneuvers  Consideration for colonoscopy examination as above.  Reassuring bleeding history accompanying the severe constipation.  We today discussed colonoscopy examination to rule out obstructing process, less likely  inflammatory process even proctitis with paradoxical constipation; risks of colonoscopy examination under MAC anesthesia and bills reviewed.  Sounds like very high deductible insurance policy.    Atypical substernal and throat chest and neck tightness, obstructive sensation     Typically provoked by alcoholic drinks, seltzers anything carbonated  No typical GERD symptoms  Sounds like acute gas discomfort of being unable to belch  No dysphagia to solids  Continue to monitor and update us    Colon cancer screening, average risk     Never smoker  Reassuring family history  Continue to monitor recent symptoms and events as above  Commence screening colonoscopy examinations at age 45 or as per clinical course, family history.              Office visit 3/26/2025:  HPI:    Patient ID: Estefany Villalba is a 35 year old fit and healthy woman, elevated BMI 32.9, extensive family history of thyroid disease who apparently has always tested normal on her thyroid function testing.    Ms. Villalba presents today to discuss change in bowel patterns, other abdominal symptoms including abdominal bloating and atypical chest, throat symptoms.    1.  Change bowel patterns.    Ms. Villalba describes starting Mounjaro tirzepatide weight loss medication May 2024.    Initially that worked very well for her, lost over 20 pounds.    Unfortunately, side effects eventually became intolerable.  She describes feeling \"sick all the time\", nausea, worsening constipation.    Discontinued the Mounjaro in October 2024, yet the severe constipation has continued.  Unfortunately the weight is coming back.    Ms. Vilallba describes severe constipation, unsatisfying/incomplete defecation difficulty evacuating desiccated pellet-like stools.  Constipation can be so severe that she has required digital manipulation, manual disimpaction.  She was concerned that she may have injured or contaminated herself with digital maneuvers.  She has been profoundly  uncomfortable.    There is associated abdominal bloating which can be severe and very malodorous gas flatulence.    Also of uncertain relation, Ms. Villalba describes difficulty with small-volume urinary incontinence, urinary leakage throughout the day with laughing or coughing.  This despite having carried a pregnancy.    Occasional palpable hemorrhoids, blood on the toilet paper with straining.    This is a significant change.  Previous baseline was healthy regular daily bowel movements.    Ms. Villalba has researched and tried multiple remedies over the past 4 months:    Currently taking a refrigerated probiotic `Bacillus Coagulans Inulin' product from [employer]    High-fiber diet, pushing vegetables including kale, very aware of healthy high-fiber options.    OTC remedies trialed:  Prunes/prune juice  FiberCon pills 1/day  Stool softener  Took MiraLAX with coffee daily for about 3 months with little effect, recently weaned off  Milk of magnesia  Stimulant laxative pills and teas  Fleets suppository  Psyllium Metamucil powder product    So far nothing has given consistent relief.  Constipation has had a significant impact on quality of life.    2.  Waxing and waning abdominal gas and bloating likely related to the above    3.  Somewhat atypical sensation of a blockage or obstruction her throat, cervical area with consuming carbonated beverages seltzers or alcohol.  She has the profound sensation of being unable to belch which she has never been able to do.  At times she has gagged herself to belch and bring up some gas with immediate relief.      Never smoker.    No family history of IBD or colorectal cancer.      Outpatient labs 10/21/2024:  Reassuring CBC with hemoglobin 14.6g MCV 92.4   CMP showing normal renal function, serum calcium 9.7; AST 16 ALT 21 alk phosphatase 79 serum albumin 4.9  Normal TSH 0.642 with free T4 of 1.3  Entirely normal UA    Wt Readings from Last 20 Encounters:   03/26/25 202 lb  4.8 oz (91.8 kg)   03/19/25 198 lb 12.8 oz (90.2 kg)   10/16/24 190 lb (86.2 kg)   02/14/24 197 lb (89.4 kg)   10/04/23 190 lb (86.2 kg)   08/21/23 196 lb 9.6 oz (89.2 kg)   09/07/22 195 lb (88.5 kg)   09/14/21 185 lb (83.9 kg)   04/19/21 190 lb (86.2 kg)   04/12/21 191 lb (86.6 kg)   06/08/17 163 lb (73.9 kg)         Previous EGD examination: n  Previous colonoscopy(ies): n    HPI    Review of Systems         Current Outpatient Medications   Medication Sig Dispense Refill    polyethylene glycol, PEG 3350, 17 g Oral Powd Pack Take 17 g by mouth daily.      polycarbophil 625 MG Oral Tab Take 1 tablet (625 mg total) by mouth daily.      ALPRAZolam (XANAX) 0.25 MG Oral Tab Take 1 tablet (0.25 mg total) by mouth 2 (two) times daily as needed for Anxiety. 60 tablet 0    Fexofenadine HCl (ALLEGRA ALLERGY OR) 180 mg.        diphenhydrAMINE HCl (BENADRYL ALLERGY OR)       Bacillus Coagulans-Inulin (PROBIOTIC) 1-250 BILLION-MG Oral Cap Take by mouth.           Allergies:Allergies[1]  Imaging: No results found.       PHYSICAL EXAM:   Physical Exam                   Over 55 minutes spent today discussing the above and counseling and educating this patient, reviewing chart notes and data and documenting clinical information in the EHR, independently interpreting results and communicating results to the patient/family and composing this comprehensive note, ordering medications or testing, referring and communicating with other healthcare providers, and care coordination with the patient's other providers.      Digital transcription software was utilized to produce this note. The note was proofread for content only. Typographical errors may remain.       Meds This Visit:  Requested Prescriptions      No prescriptions requested or ordered in this encounter       Imaging & Referrals:  None       ID#[id number]         [1]   Allergies  Allergen Reactions    Codeine ANAPHYLAXIS    Penicillins UNKNOWN    Seasonal OTHER (SEE COMMENTS)      Itchy, water eyes, coughing, sneezing, sometimes skin rashes

## 2025-03-26 NOTE — PATIENT INSTRUCTIONS
1. Daily laxative regimen to make you more regular. Goal is 1-2 easy bowel movements per day.  Need to take every day or several times per week.  Options:    A.  Daily dose of fiber powder - helps some people and makes some people worse (diarrhea or bloating).  Only the POWDERS work, not capsules or gummies.  -  Metamucil 1-2 heaping tablespoons in water or juice once or twice daily , also comes as pills which aren't as effective     -  Best: Konsyl fiber powder (unflavored - no sugar or flavor)(white and red plastic Ziploc bag/package) -- usually not on pharmacy shelves, often need to order for shipment or in store pickup at [place], [facility] or [facility], or of course [facility] - 1 heaping tablespoon in water or juice once or twice per day; or    -  Benefiber (same) (synthetic; less bloating)    -  Marguerite's Tummy Fiber - (internet) organic kenia    B. less bloating: \"Miralax\"  (white and purple bottle) 1/2 to 1 capful of powder in a glass of water daily (or at least 3x per week);  May lose effect in 1-2 years  OR \"Milk of Magnesia\" 1-2 TBSP once or twice daily, sometimes as back-up  OR \"Magnesium Oxide\" or \"Magnesium Citrate\" pills - 400-800mg per day  OR \"CALM\" laxative (magnesium)    C. Stronger natural remedies -   Aloe Vera liquid/syrup or capsules - [employer] and [place]/many [facility];  Papaya or Prunes/prune juice does really work; Dragon Fruit may work  \"Calm\" magnesium product: Gentle laxative and apparently magnesium helps with sleep    \"Inno Cleanse\" - internet - multiple ingredients see the website; laxative and detox  Atrantil supplement ([place], [place], and Peppermint leaf extract) - Internet - helps with constipation and bloating/misery      Try stepping up the Miralax to 1 capful twice daily    Magnesium Citrate/ citrate of magnesia 1-2 bottles to EMPTY OUT    4L (one gallon) \"polyethylene glycol\" \"PEG\" bowel prep - prescription only    \"Probiotics\" may help. Health food  stores sell these. Most common is \"Align\" - available anywhere.   Others include Florastor, Karoline-Q, VSL #3 or Visbiome and Kefir (most expensive)

## 2025-03-31 ENCOUNTER — TELEPHONE (OUTPATIENT)
Dept: OBGYN CLINIC | Facility: CLINIC | Age: 35
End: 2025-03-31

## 2025-03-31 ENCOUNTER — TELEPHONE (OUTPATIENT)
Facility: CLINIC | Age: 35
End: 2025-03-31

## 2025-03-31 NOTE — TELEPHONE ENCOUNTER
Patient informed of pap and human papillomavirus results.  Patient informed recommendations are to repeat pap in one year.  Patient expressed understanding.   No

## 2025-03-31 NOTE — TELEPHONE ENCOUNTER
Current Outpatient Medications   Medication Sig Dispense Refill    linaCLOtide 145 MCG Oral Cap Take 145 mcg by mouth daily. 90 capsule 3     Key: [id number]

## 2025-04-09 NOTE — TELEPHONE ENCOUNTER
I received a fax from [employer] is approved     Effective 03/07/2025-04/06/2026    [id number]    Letter sent to scanning

## 2025-07-16 ENCOUNTER — PATIENT MESSAGE (OUTPATIENT)
Dept: INTERNAL MEDICINE CLINIC | Facility: CLINIC | Age: 35
End: 2025-07-16

## 2025-07-21 RX ORDER — SCOPOLAMINE 1 MG/3D
1 PATCH, EXTENDED RELEASE TRANSDERMAL
Qty: 5 PATCH | Refills: 0 | Status: SHIPPED | OUTPATIENT
Start: 2025-07-21

## 2025-08-26 ENCOUNTER — PATIENT MESSAGE (OUTPATIENT)
Dept: OBGYN CLINIC | Facility: CLINIC | Age: 35
End: 2025-08-26

## (undated) NOTE — LETTER
09/28/21        Estefany Villalba  [address]      Dear Ahmet Obregon,    Our records indicate that you have outstanding lab work and or testing that was ordered for you and has not yet been completed:  Orders Placed This Encounter      [facility]

## (undated) NOTE — LETTER
Date & Time: 5/1/2024, 11:24 AM  Patient: Estefany Villalba  Encounter Provider(s):    Yumiko Scales PA-C       To Whom It May Concern:    Estefany Villalba was seen and treated in our department on 5/1/2024.     If you have any questions or concerns, please do not hesitate to call.        _____________________________  Physician/APC Signature

## (undated) NOTE — LETTER
9/16/2021              [address]         Dear Iggy Avendaño,    Our records indicate that the tests ordered for you by Holger Hernández MD  have not been done.   If you have, in fact, already completed the gertrudis

## (undated) NOTE — LETTER
07/07/21        Estefany Villalba  [address] [address]      Dear Martha Rhoades,    Our records indicate that you have outstanding lab work and or testing that was ordered for you and has not yet been completed:  Orders Placed This Encounter      [facility]

## (undated) NOTE — LETTER
07/07/21        Estefany Villalba  [address] [facility] [facility] [address]      Dear Khadar De La Cruz,    Our records indicate that you have outstanding lab work and or testing that was ordered for you and has not yet been completed:  Orders Placed This Encounter      [facility]

## (undated) NOTE — LETTER
AUTHORIZATION FOR SURGICAL OPERATION OR OTHER PROCEDURE    1. I hereby authorize KALEIGH ROOT and [facility] staff assigned to my case to perform the following operation and/or procedure at the [facility]:    IUD REMOVAL      _______________________________________________________________________________________________    2.  My physician has explained the nature and purpose of the operation or other procedure, possible alternative methods of treatment, the risks involved, and the possibility of complication to me.  I acknowledge that no guarantee has been made as to the result that may be obtained.  3.  I recognize that, during the course of this operation, or other procedure, unforseen conditions may necessitate additional or different procedure than those listed above.  I, therefore, further authorize and request that the above named physician, his/her physician assistants or designees perform such procedures as are, in his/her professional opinion, necessary and desirable.  4.  Any tissue or organs removed in the operation or other procedure may be disposed of by and at the discretion of the [facility] and [facility].  5.  I understand that in the event of a medical emergency, I will be transported by local paramedics to [facility] or other hospital emergency department.  6.  I certify that I have read and fully understand the above consent to operation and/or other procedure.    7.  I acknowledge that my physician has explained sedation/analgesia administration to me including the risks and benefits.  I consent to the administration of sedation/analgesia as may be necessary or desirable in the judgement of my physician.    Witness signature: ___________________________________________________ Date:  ______/______/_____                    Time:  ________ A.M.  P.M.       Patient Name:  ______________________________________________________  (please  print)      Patient signature:  ___________________________________________________             Relationship to Patient:           []  Parent    Responsible person                          []  Spouse  In case of minor or                    [] Other  _____________   Incompetent name:  __________________________________________________                               (please print)      _____________      Responsible person  In case of minor or  Incompetent signature:  _______________________________________________    Statement of Physician  My signature below affirms that prior to the time of the procedure, I have explained to the patient and/or his/her guardian, the risks and benefits involved in the proposed treatment and any reasonable alternative to the proposed treatment.  I have also explained the risks and benefits involved in the refusal of the proposed treatment and have answered the patient's questions.                        Date:  ______/______/_______  Provider                      Signature:  __________________________________________________________       Time:  ___________ A.M    P.M.